# Patient Record
Sex: FEMALE | Race: WHITE | Employment: FULL TIME | URBAN - METROPOLITAN AREA
[De-identification: names, ages, dates, MRNs, and addresses within clinical notes are randomized per-mention and may not be internally consistent; named-entity substitution may affect disease eponyms.]

---

## 2019-07-05 ENCOUNTER — APPOINTMENT (OUTPATIENT)
Dept: ULTRASOUND IMAGING | Age: 59
End: 2019-07-05
Attending: EMERGENCY MEDICINE
Payer: COMMERCIAL

## 2019-07-05 ENCOUNTER — HOSPITAL ENCOUNTER (EMERGENCY)
Age: 59
Discharge: HOME | End: 2019-07-05
Attending: EMERGENCY MEDICINE
Payer: COMMERCIAL

## 2019-07-05 VITALS
WEIGHT: 272.49 LBS | OXYGEN SATURATION: 98 % | BODY MASS INDEX: 46.52 KG/M2 | TEMPERATURE: 98.1 F | SYSTOLIC BLOOD PRESSURE: 124 MMHG | DIASTOLIC BLOOD PRESSURE: 65 MMHG | HEIGHT: 64 IN | RESPIRATION RATE: 18 BRPM | HEART RATE: 69 BPM

## 2019-07-05 DIAGNOSIS — M79.604 PAIN OF RIGHT LOWER EXTREMITY: Primary | ICD-10-CM

## 2019-07-05 DIAGNOSIS — R79.1 ELEVATED INR: ICD-10-CM

## 2019-07-05 LAB — INR BLD: 4.4 (ref 0.9–1.2)

## 2019-07-05 PROCEDURE — 85610 PROTHROMBIN TIME: CPT

## 2019-07-05 PROCEDURE — 93971 EXTREMITY STUDY: CPT

## 2019-07-05 PROCEDURE — 99283 EMERGENCY DEPT VISIT LOW MDM: CPT

## 2019-07-05 NOTE — DISCHARGE INSTRUCTIONS
Patient Education        Leg Pain: Care Instructions  Your Care Instructions  Many things can cause leg pain. Too much exercise or overuse can cause a muscle cramp (or charley horse). You can get leg cramps from not eating a balanced diet that has enough potassium, calcium, and other minerals. If you do not drink enough fluids or are taking certain medicines, you may develop leg cramps. Other causes of leg pain include injuries, blood flow problems, nerve damage, and twisted and enlarged veins (varicose veins). You can usually ease pain with self-care. Your doctor may recommend that you rest your leg and keep it elevated. Follow-up care is a key part of your treatment and safety. Be sure to make and go to all appointments, and call your doctor if you are having problems. It's also a good idea to know your test results and keep a list of the medicines you take. How can you care for yourself at home? · Take pain medicines exactly as directed. ? If the doctor gave you a prescription medicine for pain, take it as prescribed. ? If you are not taking a prescription pain medicine, ask your doctor if you can take an over-the-counter medicine. · Take any other medicines exactly as prescribed. Call your doctor if you think you are having a problem with your medicine. · Rest your leg while you have pain, and avoid standing for long periods of time. · Prop up your leg at or above the level of your heart when possible. · Make sure you are eating a balanced diet that is rich in calcium, potassium, and magnesium, especially if you are pregnant. · If directed by your doctor, put ice or a cold pack on the area for 10 to 20 minutes at a time. Put a thin cloth between the ice and your skin. · Your leg may be in a splint, a brace, or an elastic bandage, and you may have crutches to help you walk. Follow your doctor's directions about how long to wear supports and how to use the crutches.   When should you call for help?  Call 911 anytime you think you may need emergency care. For example, call if:    · You have sudden chest pain and shortness of breath, or you cough up blood.     · Your leg is cool or pale or changes color.    Call your doctor now or seek immediate medical care if:    · You have increasing or severe pain.     · Your leg suddenly feels weak and you cannot move it.     · You have signs of a blood clot, such as:  ? Pain in your calf, back of the knee, thigh, or groin. ? Redness and swelling in your leg or groin.     · You have signs of infection, such as:  ? Increased pain, swelling, warmth, or redness. ? Red streaks leading from the sore area. ? Pus draining from a place on your leg. ? A fever.     · You cannot bear weight on your leg.    Watch closely for changes in your health, and be sure to contact your doctor if:    · You do not get better as expected. Where can you learn more? Go to http://kd-kecia.info/. Enter Y380 in the search box to learn more about \"Leg Pain: Care Instructions. \"  Current as of: September 23, 2018  Content Version: 11.9  © 4743-0111 [employer]. Care instructions adapted under license by [employer] (which disclaims liability or warranty for this information). If you have questions about a medical condition or this instruction, always ask your healthcare professional. [place] [address] any warranty or liability for your use of this information.

## 2019-07-05 NOTE — ED PROVIDER NOTES
Pt. Presents to the ER with complaints of right lower leg pain. Pt. Has a history of a clotting disorder, and she is on coumadin. Pt. Was seen by [facility] and was sent to the ER for evaluation for possible DVT. Pt. Says that she has been taking her coumadin regularly. No CP or SOB. NO n/v/d.  NO changes with her urine or bowel movements. Her pain originally started near her right foot, but it has migrated to behind the right knee. Pt. Has not noticed swelling to her right leg.              Past Medical History:   Diagnosis Date    Diabetes ([facility] [address]     pre-diabetic    GERD (gastroesophageal reflux disease)     Hypertension     Other ill-defined conditions(799.89)     environmental allergies - oak, pollen, dust, mold    Other ill-defined conditions(799.89)     clotting disorder - makes too much factor 8    Thromboembolus ([facility] [address]     PE x 3 - all at once    Thrombophilia ([facility] [address]     Thyroid disease        Past Surgical History:   Procedure Laterality Date    HX APPENDECTOMY  1993    HX GYN      lysis of adhesions 3-4 x for endometriosis    HX HYSTERECTOMY  1994    with oophorectomy    HX OTHER SURGICAL  2010    EGD - h.pylori/colon - diverticulosis    HX OTHER SURGICAL      surgery at 6 wks of age for ?plyoric stenosis         Family History:   Problem Relation Age of Onset    Kidney Disease Mother         failure    Diabetes Mother        Social History     Socioeconomic History    Marital status:      Spouse name: Not on file    Number of children: Not on file    Years of education: Not on file    Highest education level: Not on file   Occupational History    Not on file   Social Needs    Financial resource strain: Not on file    Food insecurity:     Worry: Not on file     Inability: Not on file    Transportation needs:     Medical: Not on file     Non-medical: Not on file   Tobacco Use    Smoking status: Never Smoker    Smokeless tobacco: Never Used   Substance and Sexual Activity  Alcohol use: No    Drug use: Not on file    Sexual activity: Not on file   Lifestyle    Physical activity:     Days per week: Not on file     Minutes per session: Not on file    Stress: Not on file   Relationships    Social connections:     Talks on phone: Not on file     Gets together: Not on file     Attends Sikhism service: Not on file     Active member of club or organization: Not on file     Attends meetings of clubs or organizations: Not on file     Relationship status: Not on file    Intimate partner violence:     Fear of current or ex partner: Not on file     Emotionally abused: Not on file     Physically abused: Not on file     Forced sexual activity: Not on file   Other Topics Concern    Not on file   Social History Narrative    Not on file         ALLERGIES: Biaxin [clarithromycin]; Levaquin [levofloxacin]; Naprosyn [naproxen]; Pcn [penicillins]; and Tequin [gatifloxacin]    Review of Systems   Constitutional: Negative for chills and fever. HENT: Negative for rhinorrhea and sore throat. Respiratory: Negative for cough and shortness of breath. Cardiovascular: Negative for chest pain. Gastrointestinal: Negative for abdominal pain, diarrhea, nausea and vomiting. Genitourinary: Negative for dysuria and urgency. Musculoskeletal:        Right leg pain   Skin: Negative for rash. Neurological: Negative for dizziness, weakness and light-headedness. Vitals:    07/05/19 1745   BP: 127/74   Pulse: 64   Resp: 16   Temp: 97.9 °F (36.6 °C)   SpO2: 93%   Weight: 123.6 kg (272 lb 7.8 oz)   Height: 5' 4\" (1.626 m)            Physical Exam     Vital signs reviewed. Nursing notes reviewed.     Const:  No acute distress, well developed, well nourished  Head:  Atraumatic, normocephalic  Eyes:  PERRL, conjunctiva normal, no scleral icterus  Neck:  Supple, trachea midline  Cardiovascular:  RRR, no murmurs, no gallops, no rubs  Resp:  No resp distress, no increased work of breathing, no wheezes, no rhonchi, no rales,  Abd:  Soft, non-tender, non-distended, no rebound, no guarding, no CVA tenderness  MSK:  No pedal edema, normal ROM, no tenderness to palpation of the RLE  Neuro:  Alert and oriented x3, no cranial nerve defect  Skin:  Warm, dry, intact  Psych: normal mood and affect, behavior is normal, judgement and thought content is normal          MDM  Number of Diagnoses or Management Options  Elevated INR:   Pain of right lower extremity:      Amount and/or Complexity of Data Reviewed  Clinical lab tests: ordered and reviewed  Tests in the radiology section of CPT®: reviewed and ordered  Review and summarize past medical records: yes    Patient Progress  Patient progress: stable           Pt. Presents to the ER with complaints of leg pain. No DVT on ultrasound. No signs of infection. Likely strain. Pt. Found to have an elevated INR. Pt. Instructed to not take her coumadin tonight. Pt. Says that she will speak to her hematologist or the on call doc tonight.         Procedures

## 2019-07-05 NOTE — ED TRIAGE NOTES
Triage Note: Patient arrives to ER complaining of right leg pain x 1 week. Pt was sent from [facility] for r/o DVT. Pt is currently taking coumadin.  Was unable to get blood level drawn fat [facility].

## 2019-07-05 NOTE — LETTER
[address] 
[facility] 
[address] [id number] 
[phone] Work/School Note Date: 7/5/2019 To Whom It May concern: 
 
Alma Clarke was seen and treated today in the emergency room by the following provider(s): 
No providers found. Alma Clarke may return to work on Sunday July 7, 2019.  
 
Sincerely, 
 
 
 
 
Norma Mcgowan RN

## 2019-07-05 NOTE — ED NOTES
Pt ambulatory out of ED with discharge instructions and prescriptions in hand given by Dr. Monalisa Whitehead; pt verbalized understanding of discharge paperwork and time allotted for questions. VSS. Pt alert and oriented.

## 2019-08-06 ENCOUNTER — APPOINTMENT (OUTPATIENT)
Dept: CT IMAGING | Age: 59
End: 2019-08-06
Attending: EMERGENCY MEDICINE
Payer: COMMERCIAL

## 2019-08-06 ENCOUNTER — HOSPITAL ENCOUNTER (EMERGENCY)
Age: 59
Discharge: HOME | End: 2019-08-06
Attending: EMERGENCY MEDICINE
Payer: COMMERCIAL

## 2019-08-06 VITALS
HEART RATE: 75 BPM | HEIGHT: 64 IN | RESPIRATION RATE: 16 BRPM | TEMPERATURE: 98.3 F | SYSTOLIC BLOOD PRESSURE: 130 MMHG | DIASTOLIC BLOOD PRESSURE: 72 MMHG | BODY MASS INDEX: 45.77 KG/M2 | OXYGEN SATURATION: 95 % | WEIGHT: 268.08 LBS

## 2019-08-06 DIAGNOSIS — R79.1 ELEVATED INR: ICD-10-CM

## 2019-08-06 DIAGNOSIS — R10.84 ABDOMINAL PAIN, GENERALIZED: Primary | ICD-10-CM

## 2019-08-06 LAB
ALBUMIN SERPL-MCNC: 3.6 G/DL (ref 3.5–5)
ALBUMIN/GLOB SERPL: 0.9 {RATIO} (ref 1.1–2.2)
ALP SERPL-CCNC: 114 U/L (ref 45–117)
ALT SERPL-CCNC: 35 U/L (ref 12–78)
ANION GAP SERPL CALC-SCNC: 11 MMOL/L (ref 5–15)
AST SERPL-CCNC: 20 U/L (ref 15–37)
BASOPHILS # BLD: 0.1 K/UL (ref 0–0.1)
BASOPHILS NFR BLD: 1 % (ref 0–1)
BILIRUB DIRECT SERPL-MCNC: 0.1 MG/DL (ref 0–0.2)
BILIRUB SERPL-MCNC: 0.3 MG/DL (ref 0.2–1)
BUN SERPL-MCNC: 11 MG/DL (ref 6–20)
BUN/CREAT SERPL: 13 (ref 12–20)
CALCIUM SERPL-MCNC: 9.5 MG/DL (ref 8.5–10.1)
CHLORIDE SERPL-SCNC: 99 MMOL/L (ref 97–108)
CO2 SERPL-SCNC: 28 MMOL/L (ref 21–32)
COMMENT, HOLDF: NORMAL
CREAT SERPL-MCNC: 0.83 MG/DL (ref 0.55–1.02)
DIFFERENTIAL METHOD BLD: ABNORMAL
EOSINOPHIL # BLD: 0.4 K/UL (ref 0–0.4)
EOSINOPHIL NFR BLD: 3 % (ref 0–7)
ERYTHROCYTE [DISTWIDTH] IN BLOOD BY AUTOMATED COUNT: 16 % (ref 11.5–14.5)
GLOBULIN SER CALC-MCNC: 4.2 G/DL (ref 2–4)
GLUCOSE SERPL-MCNC: 102 MG/DL (ref 65–100)
HCT VFR BLD AUTO: 40.7 % (ref 35–47)
HGB BLD-MCNC: 13.2 G/DL (ref 11.5–16)
IMM GRANULOCYTES # BLD AUTO: 0 K/UL (ref 0–0.04)
IMM GRANULOCYTES NFR BLD AUTO: 0 % (ref 0–0.5)
INR BLD: 5.2 (ref 0.9–1.2)
LIPASE SERPL-CCNC: 125 U/L (ref 73–393)
LYMPHOCYTES # BLD: 3.7 K/UL (ref 0.8–3.5)
LYMPHOCYTES NFR BLD: 28 % (ref 12–49)
MCH RBC QN AUTO: 27.8 PG (ref 26–34)
MCHC RBC AUTO-ENTMCNC: 32.4 G/DL (ref 30–36.5)
MCV RBC AUTO: 85.7 FL (ref 80–99)
MONOCYTES # BLD: 1.5 K/UL (ref 0–1)
MONOCYTES NFR BLD: 11 % (ref 5–13)
NEUTS SEG # BLD: 7.4 K/UL (ref 1.8–8)
NEUTS SEG NFR BLD: 56 % (ref 32–75)
NRBC # BLD: 0.02 K/UL (ref 0–0.01)
NRBC BLD-RTO: 0.2 PER 100 WBC
PLATELET # BLD AUTO: 391 K/UL (ref 150–400)
PMV BLD AUTO: 10.2 FL (ref 8.9–12.9)
POTASSIUM SERPL-SCNC: 4 MMOL/L (ref 3.5–5.1)
PROT SERPL-MCNC: 7.8 G/DL (ref 6.4–8.2)
RBC # BLD AUTO: 4.75 M/UL (ref 3.8–5.2)
SAMPLES BEING HELD,HOLD: NORMAL
SODIUM SERPL-SCNC: 138 MMOL/L (ref 136–145)
WBC # BLD AUTO: 13.2 K/UL (ref 3.6–11)

## 2019-08-06 PROCEDURE — 74011250636 HC RX REV CODE- 250/636: Performed by: EMERGENCY MEDICINE

## 2019-08-06 PROCEDURE — 80048 BASIC METABOLIC PNL TOTAL CA: CPT

## 2019-08-06 PROCEDURE — 99283 EMERGENCY DEPT VISIT LOW MDM: CPT

## 2019-08-06 PROCEDURE — 83690 ASSAY OF LIPASE: CPT

## 2019-08-06 PROCEDURE — 99284 EMERGENCY DEPT VISIT MOD MDM: CPT

## 2019-08-06 PROCEDURE — 85610 PROTHROMBIN TIME: CPT

## 2019-08-06 PROCEDURE — 74011636320 HC RX REV CODE- 636/320: Performed by: EMERGENCY MEDICINE

## 2019-08-06 PROCEDURE — 80076 HEPATIC FUNCTION PANEL: CPT

## 2019-08-06 PROCEDURE — 85025 COMPLETE CBC W/AUTO DIFF WBC: CPT

## 2019-08-06 PROCEDURE — 36415 COLL VENOUS BLD VENIPUNCTURE: CPT

## 2019-08-06 PROCEDURE — 74177 CT ABD & PELVIS W/CONTRAST: CPT

## 2019-08-06 PROCEDURE — 96360 HYDRATION IV INFUSION INIT: CPT

## 2019-08-06 RX ORDER — SODIUM CHLORIDE 9 MG/ML
10 INJECTION INTRAMUSCULAR; INTRAVENOUS; SUBCUTANEOUS ONCE
Status: COMPLETED | OUTPATIENT
Start: 2019-08-06 | End: 2019-08-06

## 2019-08-06 RX ORDER — DICYCLOMINE HYDROCHLORIDE 10 MG/1
10 CAPSULE ORAL
Qty: 12 CAP | Refills: 0 | Status: SHIPPED | OUTPATIENT
Start: 2019-08-06 | End: 2019-08-11

## 2019-08-06 RX ORDER — ONDANSETRON 4 MG/1
4 TABLET, ORALLY DISINTEGRATING ORAL
Qty: 15 TAB | Refills: 0 | Status: SHIPPED | OUTPATIENT
Start: 2019-08-06 | End: 2020-03-31

## 2019-08-06 RX ORDER — SODIUM CHLORIDE 9 MG/ML
50 INJECTION, SOLUTION INTRAVENOUS
Status: COMPLETED | OUTPATIENT
Start: 2019-08-06 | End: 2019-08-06

## 2019-08-06 RX ADMIN — SODIUM CHLORIDE 1000 ML: 900 INJECTION, SOLUTION INTRAVENOUS at 18:12

## 2019-08-06 RX ADMIN — IOPAMIDOL 100 ML: 755 INJECTION, SOLUTION INTRAVENOUS at 18:08

## 2019-08-06 RX ADMIN — SODIUM CHLORIDE 10 ML: 9 INJECTION INTRAMUSCULAR; INTRAVENOUS; SUBCUTANEOUS at 18:07

## 2019-08-06 RX ADMIN — SODIUM CHLORIDE 50 ML/HR: 900 INJECTION, SOLUTION INTRAVENOUS at 18:07

## 2019-08-06 NOTE — DISCHARGE INSTRUCTIONS
Patient Education        Abdominal Pain: Care Instructions  Your Care Instructions    Abdominal pain has many possible causes. Some aren't serious and get better on their own in a few days. Others need more testing and treatment. If your pain continues or gets worse, you need to be rechecked and may need more tests to find out what is wrong. You may need surgery to correct the problem. Don't ignore new symptoms, such as fever, nausea and vomiting, urination problems, pain that gets worse, and dizziness. These may be signs of a more serious problem. Your doctor may have recommended a follow-up visit in the next 8 to 12 hours. If you are not getting better, you may need more tests or treatment. The doctor has checked you carefully, but problems can develop later. If you notice any problems or new symptoms, get medical treatment right away. Follow-up care is a key part of your treatment and safety. Be sure to make and go to all appointments, and call your doctor if you are having problems. It's also a good idea to know your test results and keep a list of the medicines you take. How can you care for yourself at home? · Rest until you feel better. · To prevent dehydration, drink plenty of fluids, enough so that your urine is light yellow or clear like water. Choose water and other caffeine-free clear liquids until you feel better. If you have kidney, heart, or liver disease and have to limit fluids, talk with your doctor before you increase the amount of fluids you drink. · If your stomach is upset, eat mild foods, such as rice, dry toast or crackers, bananas, and applesauce. Try eating several small meals instead of two or three large ones. · Wait until 48 hours after all symptoms have gone away before you have spicy foods, alcohol, and drinks that contain caffeine. · Do not eat foods that are high in fat. · Avoid anti-inflammatory medicines such as aspirin, ibuprofen (Advil, Motrin), and naproxen (Aleve). These can cause stomach upset. Talk to your doctor if you take daily aspirin for another health problem. When should you call for help? Call 911 anytime you think you may need emergency care. For example, call if:    · You passed out (lost consciousness).     · You pass maroon or very bloody stools.     · You vomit blood or what looks like coffee grounds.     · You have new, severe belly pain.    Call your doctor now or seek immediate medical care if:    · Your pain gets worse, especially if it becomes focused in one area of your belly.     · You have a new or higher fever.     · Your stools are black and look like tar, or they have streaks of blood.     · You have unexpected vaginal bleeding.     · You have symptoms of a urinary tract infection. These may include:  ? Pain when you urinate. ? Urinating more often than usual.  ? Blood in your urine.     · You are dizzy or lightheaded, or you feel like you may faint.    Watch closely for changes in your health, and be sure to contact your doctor if:    · You are not getting better after 1 day (24 hours). Where can you learn more? Go to http://kdBirthday Gorillakecia.info/. Enter C042 in the search box to learn more about \"Abdominal Pain: Care Instructions. \"  Current as of: September 23, 2018  Content Version: 12.1  © 8852-7087 [employer]. Care instructions adapted under license by [employer] (which disclaims liability or warranty for this information). If you have questions about a medical condition or this instruction, always ask your healthcare professional. [place] [address] any warranty or liability for your use of this information. Patient Education     High INR Test Result: Care Instructions  Your Care Instructions  You had a blood test to check how long it takes your blood to clot. This test is called a PT or prothrombin time test. The result of the test is called the INR level.   A high INR level can happen when you take warfarin (Coumadin). Warfarin helps prevent blood clots. To do this, it slows the amount of time it takes for your blood to clot. This raises your INR level. The INR goal for people who take warfarin is usually from 2 to 3.5. A value higher than 3.5 increases the risk of bleeding problems. Many things can affect the way warfarin works. Some natural health products and other medicines can make warfarin work too well. That can raise the risk of bleeding. If you drink a lot of alcohol, that may raise your INR. And severe diarrhea or vomiting can also raise your INR. The best way to lower your INR will depend on several things. In some cases, the doctor may have you stop taking warfarin for a few days. You may also be given other medicines to take. You will need to be tested often to make sure your INR level is going down. You will also need to watch for signs of bleeding. The doctor has checked you carefully, but problems can develop later. If you notice any problems or new symptoms, get medical treatment right away. Follow-up care is a key part of your treatment and safety. Be sure to make and go to all appointments, and call your doctor if you are having problems. It's also a good idea to know your test results and keep a list of the medicines you take. How can you care for yourself at home? Be careful with medicines and foods  · Don't start or stop taking any medicines, vitamins, or natural remedies unless you first talk to your doctor. · Keep the amount of vitamin K in your diet about the same from day to day. Do not suddenly eat a lot more or a lot less food that is rich in vitamin K than you usually do. Vitamin K affects how warfarin works and how your blood clots. · Avoid cranberry juice and other cranberry products. They can increase the effects of warfarin. · Limit your use of alcohol. Avoid bleeding by preventing falls  · Wear slippers or shoes with nonskid soles.   · Remove throw rugs and clutter. · Rearrange furniture and electrical cords to keep them out of walking paths. · Keep stairways, porches, and outside walkways well lit. Use night-lights in hallways and bathrooms. · Be extra careful when you work with sharp tools or knives. When should you call for help? Call 911 anytime you think you may need emergency care. For example, call if:  · You have a sudden, severe headache that is different from past headaches. Call your doctor now or seek immediate medical care if:  · You have any abnormal bleeding, such as:  ¨ Nosebleeds. ¨ Vaginal bleeding that is different (heavier, more frequent, at a different time of the month) than what you are used to. ¨ Bloody or black stools, or rectal bleeding. ¨ Bloody or pink urine. Watch closely for changes in your health, and be sure to contact your doctor if you have any problems. Where can you learn more? Go to [employer]be  Enter C178 in the search box to learn more about \"High INR Test Result: Care Instructions. \"   © 5542-4967 [employer]. Care instructions adapted under license by [employer] (which disclaims liability or warranty for this information). This care instruction is for use with your licensed healthcare professional. If you have questions about a medical condition or this instruction, always ask your healthcare professional. [place] [address] any warranty or liability for your use of this information.   Content Version: 08.9.896745; Current as of: November 20, 2015

## 2019-08-06 NOTE — ED TRIAGE NOTES
Pt states after eating at a restaurant, having salad, salmon, pasta, and cake, she began having upper abdominal pain. Nauseous last night, vomited once during night, now having lower abdominal pain. Denies urinary sx's. Recently on keflex for abscess on left labia, stopped over weekend d/t diarrhea. Started Thurs, stopped yesterday.

## 2019-08-06 NOTE — ED PROVIDER NOTES
76-year-old white female presents to the emergency department with abdominal pain. Patient reports she went to dinner with her  last night. She came in from dinner and was not feeling well. She was having some mid and upper abdominal pain. The pain continued throughout the night. She then states the pain is now in the lower abdomen. Pain is dull in nature. Pain does not radiate. No vomiting. No diarrhea. No dysuria or hematuria. No fevers. [facility] and [facility] that showed white blood cell count of 11. Normal electrolytes. Urinalysis was clear. Patient was sent here for CT of the abdomen. She denies alcohol or tobacco use. Patient does have a history of pancreatic pseudocyst and pancreatic surgery several years ago. She also had a splenectomy at that time. Patient is on Coumadin for a blood clotting disorder.            Past Medical History:   Diagnosis Date    Diabetes ([address]     pre-diabetic    GERD (gastroesophageal reflux disease)     Hypertension     Other ill-defined conditions(799.89)     environmental allergies - oak, pollen, dust, mold    Other ill-defined conditions(799.89)     clotting disorder - makes too much factor 8    Thromboembolus ([address]     PE x 3 - all at once    Thrombophilia ([facility] [address]     Thyroid disease        Past Surgical History:   Procedure Laterality Date    HX APPENDECTOMY  1993    HX GYN      lysis of adhesions 3-4 x for endometriosis    HX HYSTERECTOMY  1994    with oophorectomy    HX OTHER SURGICAL  2010    EGD - h.pylori/colon - diverticulosis    HX OTHER SURGICAL      surgery at 6 wks of age for ?plyoric stenosis         Family History:   Problem Relation Age of Onset    Kidney Disease Mother         failure    Diabetes Mother        Social History     Socioeconomic History    Marital status:      Spouse name: Not on file    Number of children: Not on file    Years of education: Not on file    Highest education level: Not on file   Occupational History    Not on file   Social Needs    Financial resource strain: Not on file    Food insecurity:     Worry: Not on file     Inability: Not on file    Transportation needs:     Medical: Not on file     Non-medical: Not on file   Tobacco Use    Smoking status: Never Smoker    Smokeless tobacco: Never Used   Substance and Sexual Activity    Alcohol use: No    Drug use: Not on file    Sexual activity: Not on file   Lifestyle    Physical activity:     Days per week: Not on file     Minutes per session: Not on file    Stress: Not on file   Relationships    Social connections:     Talks on phone: Not on file     Gets together: Not on file     Attends Sikhism service: Not on file     Active member of club or organization: Not on file     Attends meetings of clubs or organizations: Not on file     Relationship status: Not on file    Intimate partner violence:     Fear of current or ex partner: Not on file     Emotionally abused: Not on file     Physically abused: Not on file     Forced sexual activity: Not on file   Other Topics Concern    Not on file   Social History Narrative    Not on file         ALLERGIES: Biaxin [clarithromycin]; Levaquin [levofloxacin]; Naprosyn [naproxen]; Pcn [penicillins]; and Tequin [gatifloxacin]    Review of Systems   Constitutional: Negative for fever. HENT: Negative for facial swelling. Eyes: Negative for pain. Respiratory: Negative for cough, chest tightness and shortness of breath. Cardiovascular: Negative for chest pain and leg swelling. Gastrointestinal: Positive for abdominal distention and abdominal pain. Negative for diarrhea and vomiting. Endocrine: Negative for polyuria. Genitourinary: Negative for difficulty urinating and flank pain. Musculoskeletal: Negative for arthralgias and back pain. Skin: Negative for color change. Allergic/Immunologic: Negative for immunocompromised state.    Neurological: Negative for dizziness and headaches. Hematological: Does not bruise/bleed easily. Psychiatric/Behavioral: Negative for confusion. All other systems reviewed and are negative. There were no vitals filed for this visit. Physical Exam   Constitutional: She is oriented to person, place, and time. She appears well-developed and well-nourished. HENT:   Head: Normocephalic and atraumatic. Right Ear: External ear normal.   Left Ear: External ear normal.   Nose: Nose normal.   Mouth/Throat: Oropharynx is clear and moist.   Eyes: Pupils are equal, round, and reactive to light. EOM are normal. No scleral icterus. Neck: Normal range of motion. Neck supple. No JVD present. No tracheal deviation present. No thyromegaly present. Cardiovascular: Normal rate, regular rhythm, normal heart sounds and intact distal pulses. Exam reveals no friction rub. No murmur heard. Pulmonary/Chest: Effort normal and breath sounds normal. No stridor. No respiratory distress. She has no wheezes. She has no rales. She exhibits no tenderness. Abdominal: Soft. Bowel sounds are normal. She exhibits no distension. There is tenderness. There is no rebound and no guarding. Mild lower abdominal pain to palpation. Musculoskeletal: Normal range of motion. She exhibits no edema or tenderness. Lymphadenopathy:     She has no cervical adenopathy. Neurological: She is alert and oriented to person, place, and time. She has normal reflexes. No cranial nerve deficit. Coordination normal.   Skin: Skin is warm and dry. No rash noted. No erythema. Psychiatric: She has a normal mood and affect. Her behavior is normal. Judgment and thought content normal.   Nursing note and vitals reviewed. MDM  Number of Diagnoses or Management Options  Diagnosis management comments: Patient has mid abdominal pain. Abdomen is fairly benign. Will check basic blood work. Will check CT of the abdomen.   [facility] at [facility]  Will reassess after CT. Patient agrees. Amount and/or Complexity of Data Reviewed  Clinical lab tests: ordered and reviewed  Tests in the radiology section of CPT®: ordered and reviewed  Tests in the medicine section of CPT®: ordered and reviewed  Decide to obtain previous medical records or to obtain history from someone other than the patient: yes  Obtain history from someone other than the patient: yes  Review and summarize past medical records: yes  Independent visualization of images, tracings, or specimens: yes    Risk of Complications, Morbidity, and/or Mortality  Presenting problems: high  Diagnostic procedures: high  Management options: high           Procedures    [facility] at [facility] was clear (scanned into media)    INR is elevated at 5.2. Will have patient not take her Coumadin today or tomorrow. She will follow-up with her primary doctor in 3 days for INR recheck. CT scan is negative    Good return precautions given to patient. Close follow up with PCP recommended. Patient and/or family voices understanding of this plan. Discharge instructions were explained by me and all concerns were addressed.

## 2019-08-06 NOTE — LETTER
[address] 
[facility] EMERGENCY [facility] 
[address] [address] [phone] Work/School Note Date: 8/6/2019 To Whom It May concern: 
 
Nicola Ramirez was seen and treated today in the emergency room by the following provider(s): 
Attending Provider: Comfort López MD. Nicola Ramirez please excuse from work 8/6 and 8/7 Sincerely, 
 
 
 
 
Malick Russ MD

## 2019-10-08 ENCOUNTER — APPOINTMENT (OUTPATIENT)
Dept: GENERAL RADIOLOGY | Age: 59
End: 2019-10-08
Attending: EMERGENCY MEDICINE
Payer: COMMERCIAL

## 2019-10-08 ENCOUNTER — HOSPITAL ENCOUNTER (EMERGENCY)
Age: 59
Discharge: HOME | End: 2019-10-09
Attending: EMERGENCY MEDICINE
Payer: COMMERCIAL

## 2019-10-08 DIAGNOSIS — D72.829 LEUKOCYTOSIS, UNSPECIFIED TYPE: ICD-10-CM

## 2019-10-08 DIAGNOSIS — R50.9 FEVER, UNSPECIFIED FEVER CAUSE: Primary | ICD-10-CM

## 2019-10-08 LAB
ALBUMIN SERPL-MCNC: 3.3 G/DL (ref 3.5–5)
ALBUMIN/GLOB SERPL: 0.8 {RATIO} (ref 1.1–2.2)
ALP SERPL-CCNC: 110 U/L (ref 45–117)
ALT SERPL-CCNC: 27 U/L (ref 12–78)
ANION GAP SERPL CALC-SCNC: 13 MMOL/L (ref 5–15)
APPEARANCE UR: CLEAR
AST SERPL-CCNC: 18 U/L (ref 15–37)
BACTERIA URNS QL MICRO: NEGATIVE /HPF
BASOPHILS # BLD: 0.1 K/UL (ref 0–0.1)
BASOPHILS NFR BLD: 0 % (ref 0–1)
BILIRUB SERPL-MCNC: 0.6 MG/DL (ref 0.2–1)
BILIRUB UR QL: NEGATIVE
BUN SERPL-MCNC: 11 MG/DL (ref 6–20)
BUN/CREAT SERPL: 13 (ref 12–20)
CALCIUM SERPL-MCNC: 9.1 MG/DL (ref 8.5–10.1)
CHLORIDE SERPL-SCNC: 101 MMOL/L (ref 97–108)
CO2 SERPL-SCNC: 24 MMOL/L (ref 21–32)
COLOR UR: NORMAL
COMMENT, HOLDF: NORMAL
CREAT SERPL-MCNC: 0.85 MG/DL (ref 0.55–1.02)
DIFFERENTIAL METHOD BLD: ABNORMAL
EOSINOPHIL # BLD: 0.5 K/UL (ref 0–0.4)
EOSINOPHIL NFR BLD: 2 % (ref 0–7)
EPITH CASTS URNS QL MICRO: NORMAL /LPF
ERYTHROCYTE [DISTWIDTH] IN BLOOD BY AUTOMATED COUNT: 15.9 % (ref 11.5–14.5)
GLOBULIN SER CALC-MCNC: 4.4 G/DL (ref 2–4)
GLUCOSE SERPL-MCNC: 102 MG/DL (ref 65–100)
GLUCOSE UR STRIP.AUTO-MCNC: NEGATIVE MG/DL
HCT VFR BLD AUTO: 42.7 % (ref 35–47)
HGB BLD-MCNC: 13.8 G/DL (ref 11.5–16)
HGB UR QL STRIP: NEGATIVE
IMM GRANULOCYTES # BLD AUTO: 0.1 K/UL (ref 0–0.04)
IMM GRANULOCYTES NFR BLD AUTO: 1 % (ref 0–0.5)
KETONES UR QL STRIP.AUTO: NEGATIVE MG/DL
LACTATE BLD-SCNC: 0.8 MMOL/L (ref 0.4–2)
LEUKOCYTE ESTERASE UR QL STRIP.AUTO: NEGATIVE
LIPASE SERPL-CCNC: 101 U/L (ref 73–393)
LYMPHOCYTES # BLD: 1.8 K/UL (ref 0.8–3.5)
LYMPHOCYTES NFR BLD: 9 % (ref 12–49)
MCH RBC QN AUTO: 28 PG (ref 26–34)
MCHC RBC AUTO-ENTMCNC: 32.3 G/DL (ref 30–36.5)
MCV RBC AUTO: 86.8 FL (ref 80–99)
MONOCYTES # BLD: 1.1 K/UL (ref 0–1)
MONOCYTES NFR BLD: 6 % (ref 5–13)
NEUTS SEG # BLD: 16.8 K/UL (ref 1.8–8)
NEUTS SEG NFR BLD: 82 % (ref 32–75)
NITRITE UR QL STRIP.AUTO: NEGATIVE
NRBC # BLD: 0.03 K/UL (ref 0–0.01)
NRBC BLD-RTO: 0.1 PER 100 WBC
PH UR STRIP: 6 [PH] (ref 5–8)
PLATELET # BLD AUTO: 372 K/UL (ref 150–400)
PMV BLD AUTO: 11.2 FL (ref 8.9–12.9)
POTASSIUM SERPL-SCNC: 3.9 MMOL/L (ref 3.5–5.1)
PROT SERPL-MCNC: 7.7 G/DL (ref 6.4–8.2)
PROT UR STRIP-MCNC: NEGATIVE MG/DL
RBC # BLD AUTO: 4.92 M/UL (ref 3.8–5.2)
RBC #/AREA URNS HPF: NORMAL /HPF (ref 0–5)
SAMPLES BEING HELD,HOLD: NORMAL
SODIUM SERPL-SCNC: 138 MMOL/L (ref 136–145)
SP GR UR REFRACTOMETRY: <1.005 (ref 1–1.03)
UROBILINOGEN UR QL STRIP.AUTO: 0.2 EU/DL (ref 0.2–1)
WBC # BLD AUTO: 20.4 K/UL (ref 3.6–11)
WBC URNS QL MICRO: NORMAL /HPF (ref 0–4)

## 2019-10-08 PROCEDURE — 80053 COMPREHEN METABOLIC PANEL: CPT

## 2019-10-08 PROCEDURE — 87040 BLOOD CULTURE FOR BACTERIA: CPT

## 2019-10-08 PROCEDURE — 99284 EMERGENCY DEPT VISIT MOD MDM: CPT

## 2019-10-08 PROCEDURE — 81001 URINALYSIS AUTO W/SCOPE: CPT

## 2019-10-08 PROCEDURE — 87086 URINE CULTURE/COLONY COUNT: CPT

## 2019-10-08 PROCEDURE — 74011250636 HC RX REV CODE- 250/636: Performed by: EMERGENCY MEDICINE

## 2019-10-08 PROCEDURE — 83605 ASSAY OF LACTIC ACID: CPT

## 2019-10-08 PROCEDURE — 83690 ASSAY OF LIPASE: CPT

## 2019-10-08 PROCEDURE — 36415 COLL VENOUS BLD VENIPUNCTURE: CPT

## 2019-10-08 PROCEDURE — 85025 COMPLETE CBC W/AUTO DIFF WBC: CPT

## 2019-10-08 RX ADMIN — SODIUM CHLORIDE 641 ML: 900 INJECTION, SOLUTION INTRAVENOUS at 22:38

## 2019-10-08 RX ADMIN — SODIUM CHLORIDE 1000 ML: 900 INJECTION, SOLUTION INTRAVENOUS at 22:31

## 2019-10-09 VITALS
TEMPERATURE: 98.5 F | SYSTOLIC BLOOD PRESSURE: 110 MMHG | HEART RATE: 89 BPM | BODY MASS INDEX: 44.86 KG/M2 | OXYGEN SATURATION: 96 % | HEIGHT: 64 IN | RESPIRATION RATE: 16 BRPM | WEIGHT: 262.79 LBS | DIASTOLIC BLOOD PRESSURE: 58 MMHG

## 2019-10-09 NOTE — ED NOTES
Patient rounded on at this time. RN reassessed patient's pain at this time. Pt declined the need to use the potty but states that they will make RN aware if the need arises. Pt belongings (including phone) within reach at this time. Patient reports that they are in a position of comfort at this time. Call light is within reach. Bed in low position. Side rails up x2. Wheels locked.  RN made patient aware that they will be updated on plan of care as it evolves and staff will be back within the hour to check on them

## 2019-10-09 NOTE — ED TRIAGE NOTES
Pt sent by [facility] for evaluation for fever ha and body aches post getting flu shot yesterday. Pt states she normally feels this way post flu shoot.  highest fever noted by pt 101 .

## 2019-10-09 NOTE — DISCHARGE INSTRUCTIONS
Learning About High White Blood Cell Counts  What are white blood cells? White blood cells (leukocytes) help protect your body from infection. Normally, when germs get inside your body, your body makes more white blood cells that search for and destroy the germs. Less often, there are medical problems where the body may make a lot more white blood cells than it needs. What happens when you have a high white blood cell count? Your white blood cell count may be high because your body is fighting an infection. But other things can cause it, such as some medicines, burns, an illness, or other health problems. When your doctor sees that your white blood cell count is high, he or she will try to find out why, and then treat the cause. What are the symptoms? A high white blood cell count alone doesn't cause any symptoms. The symptoms you feel may come from the medical problem that your white blood cells are fighting. For example, if you have pneumonia, you may have a fever and trouble breathing. These are symptoms of pneumonia, not of a high white blood cell count. How is it treated? · Your doctor may do more tests to find the problem that's making your white blood cell count high. Once your doctor finds the problem, he or she may be able to treat it. · Part of your treatment may be telling your doctor if you feel worse. Watch your temperature, and call your doctor if your fever goes up and stays up. Follow-up care is a key part of your treatment and safety. Be sure to make and go to all appointments, and call your doctor if you are having problems. It's also a good idea to know your test results and keep a list of the medicines you take. Where can you learn more? Go to http://kd-kecia.info/. Enter F242 in the search box to learn more about \"Learning About High White Blood Cell Counts. \"  Current as of: March 28, 2019  Content Version: 12.2  © 5847-7218 [employer]. Care instructions adapted under license by [employer] (which disclaims liability or warranty for this information). If you have questions about a medical condition or this instruction, always ask your healthcare professional. [address] any warranty or liability for your use of this information. Patient Education        Learning About Fever  What is a fever? A fever is a high body temperature. It's one way your body fights being sick. A fever shows that the body is responding to infection or other illnesses, both minor and severe. A fever is a symptom, not an illness by itself. A fever can be a sign that you are ill, but most fevers are not caused by a serious problem. You may have a fever with a minor illness, such as a cold. But sometimes a very serious infection may cause little or no fever. It is important to look at other symptoms, other conditions you have, and how you feel in general. In children, notice how they act and see what symptoms they complain of. What is a normal body temperature? A normal body temperature is about 98. 6ºF. Some people have a normal temperature that is a little higher or a little lower than this. Your temperature may be a little lower in the morning than it is later in the day. It may go up during hot weather or when you exercise, wear heavy clothes, or take a hot bath. Your temperature may also be different depending on how you take it. A temperature taken in the mouth (oral) or under the arm may be a little lower than your core temperature (rectal). What is a fever temperature? A core temperature of 100.4°F or above is considered a fever. What can cause a fever? A fever may be caused by:  · Infections. This is the most common cause of a fever. Examples of infections that can cause a fever include the flu, a kidney infection, or pneumonia. · Some medicines.   · Severe trauma or injury, such as a heart attack, stroke, heatstroke, or burns.  · Other medical conditions, such as arthritis and some cancers. How can you treat a fever at home? · Ask your doctor if you can take an over-the-counter pain medicine, such as acetaminophen (Tylenol), ibuprofen (Advil, Motrin), or naproxen (Aleve). Be safe with medicines. Read and follow all instructions on the label. · To prevent dehydration, drink plenty of fluids. Choose water and other caffeine-free clear liquids until you feel better. If you have kidney, heart, or liver disease and have to limit fluids, talk with your doctor before you increase the amount of fluids you drink. Follow-up care is a key part of your treatment and safety. Be sure to make and go to all appointments, and call your doctor if you are having problems. It's also a good idea to know your test results and keep a list of the medicines you take. Where can you learn more? Go to http://kd-kecia.info/. Enter W573 in the search box to learn more about \"Learning About Fever. \"  Current as of: June 26, 2019  Content Version: 12.2  © 5699-5104 [employer]. Care instructions adapted under license by [employer] (which disclaims liability or warranty for this information). If you have questions about a medical condition or this instruction, always ask your healthcare professional. [address] any warranty or liability for your use of this information.

## 2019-10-09 NOTE — ED PROVIDER NOTES
Parisa Huang is a 62 yo F with history of splenectomy who has had fever, headache and body aches toady. She states that she had the flu shot yesterday and normally gets low grade fevers after vaccinations but today her fever was to 101. [facility] and labs checked and her WBC was 23. She had been at [facility] the day before to get her flu shot and to be evaluated for dysuria and was treated for UTI with macribid which she has been taking. She states that she has had not dysuria today.              Past Medical History:   Diagnosis Date    Diabetes ([facility]     pre-diabetic    GERD (gastroesophageal reflux disease)     Hypertension     Other ill-defined conditions(799.89)     environmental allergies - oak, pollen, dust, mold    Other ill-defined conditions(799.89)     clotting disorder - makes too much factor 8    Thromboembolus ([facility]     PE x 3 - all at once    [facility]     Thyroid disease        Past Surgical History:   Procedure Laterality Date    HX APPENDECTOMY  1993    HX GYN      lysis of adhesions 3-4 x for endometriosis    HX HYSTERECTOMY  1994    with oophorectomy    HX OTHER SURGICAL  2010    EGD - h.pylori/colon - diverticulosis    HX OTHER SURGICAL      surgery at 6 wks of age for ?plyoric stenosis         Family History:   Problem Relation Age of Onset    Kidney Disease Mother         failure    Diabetes Mother        Social History     Socioeconomic History    Marital status:      Spouse name: Not on file    Number of children: Not on file    Years of education: Not on file    Highest education level: Not on file   Occupational History    Not on file   Social Needs    Financial resource strain: Not on file    Food insecurity:     Worry: Not on file     Inability: Not on file    Transportation needs:     Medical: Not on file     Non-medical: Not on file   Tobacco Use    Smoking status: Never Smoker    Smokeless tobacco: Never Used   Substance and Sexual Activity    Alcohol use: No    Drug use: Not on file    Sexual activity: Not on file   Lifestyle    Physical activity:     Days per week: Not on file     Minutes per session: Not on file    Stress: Not on file   Relationships    Social connections:     Talks on phone: Not on file     Gets together: Not on file     Attends Mos[employer] service: Not on file     Active member of club or organization: Not on file     Attends meetings of clubs or organizations: Not on file     Relationship status: Not on file    Intimate partner violence:     Fear of current or ex partner: Not on file     Emotionally abused: Not on file     Physically abused: Not on file     Forced sexual activity: Not on file   Other Topics Concern    Not on file   Social History Narrative    Not on file         ALLERGIES: Biaxin [clarithromycin]; Levaquin [levofloxacin]; Naprosyn [naproxen]; Pcn [penicillins]; and Tequin [gatifloxacin]    Review of Systems   Constitutional: Positive for fever. HENT: Negative for sore throat. Eyes: Negative for visual disturbance. Respiratory: Negative for cough. Cardiovascular: Negative for chest pain. Gastrointestinal: Negative for abdominal pain. Genitourinary: Negative for dysuria. Musculoskeletal: Positive for myalgias. Negative for back pain. Skin: Negative for rash. Neurological: Positive for headaches. Vitals:    10/08/19 2200 10/08/19 2215 10/08/19 2312 10/08/19 2315   BP: 107/52 106/72 114/58 106/56   Pulse:       Resp:       Temp:       SpO2: 95% 97% 95% 96%   Weight:       Height:                Physical Exam   Constitutional: She appears well-developed and well-nourished. No distress. HENT:   Head: Normocephalic and atraumatic. Mouth/Throat: Oropharynx is clear and moist.   Eyes: Conjunctivae and EOM are normal.   Neck: Normal range of motion and phonation normal. Neck supple. Cardiovascular: Normal rate and intact distal pulses. Pulmonary/Chest: Effort normal. No respiratory distress. She has no wheezes. She has no rales. Abdominal: Soft. Bowel sounds are normal. She exhibits no distension and no mass. There is no tenderness. There is no rebound, no guarding, no CVA tenderness, no tenderness at McBurney's point and negative Hanna's sign. Musculoskeletal: Normal range of motion. She exhibits no tenderness. Neurological: She is alert. She is not disoriented. She exhibits normal muscle tone. Skin: Skin is warm and dry. Capillary refill takes less than 2 seconds. Nursing note and vitals reviewed. [facility]     11:41 PM  PAtient reassessed and remains afebrile, in no distress. LActic acid normal.  WBC 20.4, CMP normal.  UA normal. Blood and urine cultures obtained but no source of infection found other than UA treated 2 days ago. CXR normal.  Will discharge home. Will follow-up cultures. PAtient to return if new or worsened symptoms.    Procedures

## 2019-10-10 LAB
BACTERIA SPEC CULT: NORMAL
CC UR VC: NORMAL
SERVICE CMNT-IMP: NORMAL

## 2019-10-14 LAB
BACTERIA SPEC CULT: NORMAL
SERVICE CMNT-IMP: NORMAL

## 2020-01-07 ENCOUNTER — HOSPITAL ENCOUNTER (EMERGENCY)
Age: 60
Discharge: HOME | End: 2020-01-07
Attending: EMERGENCY MEDICINE
Payer: COMMERCIAL

## 2020-01-07 VITALS
OXYGEN SATURATION: 100 % | RESPIRATION RATE: 16 BRPM | HEART RATE: 85 BPM | DIASTOLIC BLOOD PRESSURE: 62 MMHG | SYSTOLIC BLOOD PRESSURE: 126 MMHG | TEMPERATURE: 98 F

## 2020-01-07 DIAGNOSIS — R79.1 ELEVATED INR: Primary | ICD-10-CM

## 2020-01-07 LAB
INR PPP: 7 (ref 0.9–1.1)
PROTHROMBIN TIME: 67.1 SEC (ref 9–11.1)

## 2020-01-07 PROCEDURE — 99283 EMERGENCY DEPT VISIT LOW MDM: CPT

## 2020-01-07 PROCEDURE — 85610 PROTHROMBIN TIME: CPT

## 2020-01-07 PROCEDURE — 36415 COLL VENOUS BLD VENIPUNCTURE: CPT

## 2020-01-07 PROCEDURE — 74011250637 HC RX REV CODE- 250/637: Performed by: EMERGENCY MEDICINE

## 2020-01-07 RX ADMIN — PHYTONADIONE 2.5 MG: 10 INJECTION, EMULSION INTRAMUSCULAR; INTRAVENOUS; SUBCUTANEOUS at 23:15

## 2020-01-08 NOTE — DISCHARGE INSTRUCTIONS
Patient Education     High INR Test Result: Care Instructions  Your Care Instructions  You had a blood test to check how long it takes your blood to clot. This test is called a PT or prothrombin time test. The result of the test is called the INR level. A high INR level can happen when you take warfarin (Coumadin). Warfarin helps prevent blood clots. To do this, it slows the amount of time it takes for your blood to clot. This raises your INR level. The INR goal for people who take warfarin is usually from 2 to 3.5. A value higher than 3.5 increases the risk of bleeding problems. Many things can affect the way warfarin works. Some natural health products and other medicines can make warfarin work too well. That can raise the risk of bleeding. If you drink a lot of alcohol, that may raise your INR. And severe diarrhea or vomiting can also raise your INR. The best way to lower your INR will depend on several things. In some cases, the doctor may have you stop taking warfarin for a few days. You may also be given other medicines to take. You will need to be tested often to make sure your INR level is going down. You will also need to watch for signs of bleeding. The doctor has checked you carefully, but problems can develop later. If you notice any problems or new symptoms, get medical treatment right away. Follow-up care is a key part of your treatment and safety. Be sure to make and go to all appointments, and call your doctor if you are having problems. It's also a good idea to know your test results and keep a list of the medicines you take. How can you care for yourself at home? Be careful with medicines and foods  · Don't start or stop taking any medicines, vitamins, or natural remedies unless you first talk to your doctor. · Keep the amount of vitamin K in your diet about the same from day to day. Do not suddenly eat a lot more or a lot less food that is rich in vitamin K than you usually do.  Vitamin K affects how warfarin works and how your blood clots. · Avoid cranberry juice and other cranberry products. They can increase the effects of warfarin. · Limit your use of alcohol. Avoid bleeding by preventing falls  · Wear slippers or shoes with nonskid soles. · Remove throw rugs and clutter. · Rearrange furniture and electrical cords to keep them out of walking paths. · Keep stairways, porches, and outside walkways well lit. Use night-lights in hallways and bathrooms. · Be extra careful when you work with sharp tools or knives. When should you call for help? Call 911 anytime you think you may need emergency care. For example, call if:  · You have a sudden, severe headache that is different from past headaches. Call your doctor now or seek immediate medical care if:  · You have any abnormal bleeding, such as:  ¨ Nosebleeds. ¨ Vaginal bleeding that is different (heavier, more frequent, at a different time of the month) than what you are used to. ¨ Bloody or black stools, or rectal bleeding. ¨ Bloody or pink urine. Watch closely for changes in your health, and be sure to contact your doctor if you have any problems. Where can you learn more? Go to Trendabl.be  Enter C178 in the search box to learn more about \"High INR Test Result: Care Instructions. \"   © 5037-4423 [employer], Incorporated. Care instructions adapted under license by [facility] (which disclaims liability or warranty for this information). This care instruction is for use with your licensed healthcare professional. If you have questions about a medical condition or this instruction, always ask your healthcare professional. [place] [address] any warranty or liability for your use of this information.   Content Version: 59.8.351030; Current as of: November 20, 2015

## 2020-01-08 NOTE — ED PROVIDER NOTES
Ms. Lianet Siddiqi is a 15-year-old female who presents to the ER from her PCP for elevated INR. She is on Coumadin for a PE is. She had her INR checked around [place]. Her INR was 3.0. Therefore, I told her to come back and have it checked again. She went in yesterday. They called her with results today and said her INR was 6.7. She said that she does not believe it because has been high in a long time. She called her hematologist.  The patient said the hematologist did not seem that worried about it but that was fine that she come to the ER to have it rechecked. [facility]. She denies any bleeding symptoms. No blood in her stool no black stool. No bloody noses. She denies any other complaints. She reports that she has not had any changes in any medications recently. She is not had her Coumadin dose changed recently.            Past Medical History:   Diagnosis Date    Diabetes ([address]     pre-diabetic    GERD (gastroesophageal reflux disease)     Hypertension     Other ill-defined conditions(799.89)     environmental allergies - oak, pollen, dust, mold    Other ill-defined conditions(799.89)     clotting disorder - makes too much factor 8    Thromboembolus ([address]     PE x 3 - all at once    Thrombophilia ([address]     Thyroid disease        Past Surgical History:   Procedure Laterality Date    HX APPENDECTOMY  1993    HX GYN      lysis of adhesions 3-4 x for endometriosis    HX HYSTERECTOMY  1994    with oophorectomy    HX OTHER SURGICAL  2010    EGD - h.pylori/colon - diverticulosis    HX OTHER SURGICAL      surgery at 6 wks of age for ?plyoric stenosis         Family History:   Problem Relation Age of Onset    Kidney Disease Mother         failure    Diabetes Mother        Social History     Socioeconomic History    Marital status:      Spouse name: Not on file    Number of children: Not on file    Years of education: Not on file    Highest education level: Not on file   Occupational History    Not on file   Social Needs    Financial resource strain: Not on file    Food insecurity:     Worry: Not on file     Inability: Not on file    Transportation needs:     Medical: Not on file     Non-medical: Not on file   Tobacco Use    Smoking status: Never Smoker    Smokeless tobacco: Never Used   Substance and Sexual Activity    Alcohol use: No    Drug use: Not on file    Sexual activity: Not on file   Lifestyle    Physical activity:     Days per week: Not on file     Minutes per session: Not on file    Stress: Not on file   Relationships    Social connections:     Talks on phone: Not on file     Gets together: Not on file     Attends [employer] service: Not on file     Active member of club or organization: Not on file     Attends meetings of clubs or organizations: Not on file     Relationship status: Not on file    Intimate partner violence:     Fear of current or ex partner: Not on file     Emotionally abused: Not on file     Physically abused: Not on file     Forced sexual activity: Not on file   Other Topics Concern    Not on file   Social History Narrative    Not on file         ALLERGIES: Biaxin [clarithromycin]; Levaquin [levofloxacin]; Naprosyn [naproxen]; Pcn [penicillins]; and Tequin [gatifloxacin]    Review of Systems   Constitutional: Negative for chills and fever. HENT: Negative for rhinorrhea and sore throat. Respiratory: Negative for cough and shortness of breath. Cardiovascular: Negative for chest pain. Gastrointestinal: Negative for abdominal pain, diarrhea, nausea and vomiting. Genitourinary: Negative for dysuria and urgency. Musculoskeletal: Negative for arthralgias and back pain. Skin: Negative for rash. Neurological: Negative for dizziness, weakness and light-headedness.        Vitals:    01/07/20 2150   BP: 133/85   Pulse: 84   Resp: 18   Temp: 98 °F (36.7 °C)   SpO2: 96%            Physical Exam     Const: No acute distress, well developed, well nourished  Head:  Atraumatic, normocephalic  Eyes:  PERRL, conjunctiva normal, no scleral icterus  Neck:  Supple, trachea midline  Cardiovascular:  Regular rate  Resp:  No resp distress, no increased work of breathing  Abd:  non-distended  :  Deferred  MSK:  No pedal edema, normal ROM  Neuro:  Alert and oriented x3, no cranial nerve defect  Skin:  Warm, dry, intact  Psych: normal mood and affect, behavior is normal, judgement and thought content is normal        MDM  Number of Diagnoses or Management Options  Elevated INR:      Amount and/or Complexity of Data Reviewed  Clinical lab tests: ordered and reviewed  Review and summarize past medical records: yes    Patient Progress  Patient progress: stable          Ms. Andrew Cordova is a 62 yo female who presents to the ER with an elevated INR. No bleeding sx. Her INR was 7.0. I spoke with the [facility] heme doctor (that is where she goes). They recommend giving 2.5 of vitamin k and f/u. She agrees to have her INR rechecked in 2 days. She will stop the coumadin until she sees here heme doctor. Pt. To return to the ER with bleeding or any other concerns.         Procedures

## 2020-01-08 NOTE — ED TRIAGE NOTES
Patient arrives ambulatory in [facility] for, \"My pcp called me today and said I needed to have my INR redrawn because it was at a critical level. \"     PT- 58  INR- 6. 7

## 2020-02-24 ENCOUNTER — HOSPITAL ENCOUNTER (EMERGENCY)
Age: 60
Discharge: HOME | End: 2020-02-24
Attending: EMERGENCY MEDICINE
Payer: COMMERCIAL

## 2020-02-24 VITALS
RESPIRATION RATE: 16 BRPM | DIASTOLIC BLOOD PRESSURE: 52 MMHG | TEMPERATURE: 98.1 F | OXYGEN SATURATION: 100 % | SYSTOLIC BLOOD PRESSURE: 110 MMHG | HEART RATE: 68 BPM

## 2020-02-24 DIAGNOSIS — Z79.01 ANTICOAGULANT LONG-TERM USE: ICD-10-CM

## 2020-02-24 DIAGNOSIS — H66.92 LEFT OTITIS MEDIA, UNSPECIFIED OTITIS MEDIA TYPE: ICD-10-CM

## 2020-02-24 DIAGNOSIS — H10.9 CONJUNCTIVITIS OF BOTH EYES, UNSPECIFIED CONJUNCTIVITIS TYPE: Primary | ICD-10-CM

## 2020-02-24 LAB — INR BLD: 2.7 (ref 0.9–1.2)

## 2020-02-24 PROCEDURE — 85610 PROTHROMBIN TIME: CPT

## 2020-02-24 PROCEDURE — 99284 EMERGENCY DEPT VISIT MOD MDM: CPT

## 2020-02-24 RX ORDER — CIPROFLOXACIN HYDROCHLORIDE 3.5 MG/ML
1 SOLUTION/ DROPS TOPICAL 4 TIMES DAILY
Qty: 2.5 ML | Refills: 0 | Status: SHIPPED | OUTPATIENT
Start: 2020-02-24 | End: 2020-03-31

## 2020-02-24 RX ORDER — CEFDINIR 300 MG/1
300 CAPSULE ORAL 2 TIMES DAILY
Qty: 14 CAP | Refills: 0 | Status: SHIPPED | OUTPATIENT
Start: 2020-02-24 | End: 2020-03-02

## 2020-02-24 NOTE — LETTER
[facility], [place], operated by [facility] 
[facility] 
[address] [address] [phone] Work/School Note Date: 2/24/2020 To Whom It May concern: 
 
Allan Arita was seen and treated today in the emergency room by the following provider(s): 
Attending Provider: Pallavi Galvez MD. Allan Arita may return to work on 11/25/2020.  
 
Sincerely, 
 
 
 
 
Dana Veras MD

## 2020-02-24 NOTE — DISCHARGE INSTRUCTIONS
Patient Education        Pinkeye: Care Instructions  Your Care Instructions    Pinkeye is redness and swelling of the eye surface and the conjunctiva (the lining of the eyelid and the covering of the white part of the eye). Pinkeye is also called conjunctivitis. Pinkeye is often caused by infection with bacteria or a virus. Dry air, allergies, smoke, and chemicals are other common causes. Pinkeye often clears on its own in 7 to 10 days. Antibiotics only help if the pinkeye is caused by bacteria. Pinkeye caused by infection spreads easily. If an allergy or chemical is causing pinkeye, it will not go away unless you can avoid whatever is causing it. Follow-up care is a key part of your treatment and safety. Be sure to make and go to all appointments, and call your doctor if you are having problems. It's also a good idea to know your test results and keep a list of the medicines you take. How can you care for yourself at home? · Wash your hands often. Always wash them before and after you treat pinkeye or touch your eyes or face. · Use moist cotton or a clean, wet cloth to remove crust. Wipe from the inside corner of the eye to the outside. Use a clean part of the cloth for each wipe. · Put cold or warm wet cloths on your eye a few times a day if the eye hurts. · Do not wear contact lenses or eye makeup until the pinkeye is gone. Throw away any eye makeup you were using when you got pinkeye. Clean your contacts and storage case. If you wear disposable contacts, use a new pair when your eye has cleared and it is safe to wear contacts again. · If the doctor gave you antibiotic ointment or eyedrops, use them as directed. Use the medicine for as long as instructed, even if your eye starts looking better soon. Keep the bottle tip clean, and do not let it touch the eye area. · To put in eyedrops or ointment:  ? Tilt your head back, and pull your lower eyelid down with one finger. ?  Drop or squirt the medicine inside the lower lid. ? Close your eye for 30 to 60 seconds to let the drops or ointment move around. ? Do not touch the ointment or dropper tip to your eyelashes or any other surface. · Do not share towels, pillows, or washcloths while you have pinkeye. When should you call for help? Call your doctor now or seek immediate medical care if:    · You have pain in your eye, not just irritation on the surface.     · You have a change in vision or loss of vision.     · You have an increase in discharge from the eye.     · Your eye has not started to improve or begins to get worse within 48 hours after you start using antibiotics.     · Pinkeye lasts longer than 7 days.    Watch closely for changes in your health, and be sure to contact your doctor if you have any problems. Where can you learn more? Go to http://[facility] Institutekecia.info/. Enter Y392 in the search box to learn more about \"Pinkeye: Care Instructions. \"  Current as of: June 26, 2019  Content Version: 12.2  © 6963-0920 [employer]. Care instructions adapted under license by [employer] (which disclaims liability or warranty for this information). If you have questions about a medical condition or this instruction, always ask your healthcare professional. [address] any warranty or liability for your use of this information. Patient Education        Ear Infection (Otitis Media): Care Instructions  Your Care Instructions    An ear infection may start with a cold and affect the middle ear (otitis media). It can hurt a lot. Most ear infections clear up on their own in a couple of days. Most often you will not need antibiotics. This is because many ear infections are caused by a virus. Antibiotics don't work against a virus. Regular doses of pain medicines are the best way to reduce your fever and help you feel better. Follow-up care is a key part of your treatment and safety.  Be sure to make and go to all appointments, and call your doctor if you are having problems. It's also a good idea to know your test results and keep a list of the medicines you take. How can you care for yourself at home? · Take pain medicines exactly as directed. ? If the doctor gave you a prescription medicine for pain, take it as prescribed. ? If you are not taking a prescription pain medicine, take an over-the-counter medicine, such as acetaminophen (Tylenol), ibuprofen (Advil, Motrin), or naproxen (Aleve). Read and follow all instructions on the label. ? Do not take two or more pain medicines at the same time unless the doctor told you to. Many pain medicines have acetaminophen, which is Tylenol. Too much acetaminophen (Tylenol) can be harmful. · Plan to take a full dose of pain reliever before bedtime. Getting enough sleep will help you get better. · Try a warm, moist washcloth on the ear. It may help relieve pain. · If your doctor prescribed antibiotics, take them as directed. Do not stop taking them just because you feel better. You need to take the full course of antibiotics. When should you call for help? Call your doctor now or seek immediate medical care if:    · You have new or increasing ear pain.     · You have new or increasing pus or blood draining from your ear.     · You have a fever with a stiff neck or a severe headache.    Watch closely for changes in your health, and be sure to contact your doctor if:    · You have new or worse symptoms.     · You are not getting better after taking an antibiotic for 2 days. Where can you learn more? Go to http://kd-kecia.info/. Enter U289 in the search box to learn more about \"Ear Infection (Otitis Media): Care Instructions. \"  Current as of: October 21, 2018  Content Version: 12.2  © 9880-0416 [employer].  Care instructions adapted under license by [employer] (which disclaims liability or warranty for this information). If you have questions about a medical condition or this instruction, always ask your healthcare professional. [place] [address] any warranty or liability for your use of this information.

## 2020-02-24 NOTE — ED TRIAGE NOTES
TRIAGE NOTE: Pt feeling feverish since this morning, but hasn't checked her temperature (97.8). Complains of mild left ear pain, and also thought she conjunctivitis in her right eye.

## 2020-02-24 NOTE — ED PROVIDER NOTES
60-year-old female presents from home via private vehicle with a chief complaint of eye irritation and ear pain. Patient states he has been feeling ill for the past couple of days. She woke up yesterday morning with her eyes dried and crusted shut. Initially her right eye and then this morning it also involved her left eye as well. She is reported some left ear pain for the past couple of days. She has not taken any medications for the symptoms. She has had some body aches and chills as well but no measured temperature. She denies any cough, vomiting, diarrhea. She has a past medical history significant for diabetes, thromboembolism, thrombophilia. She is chronically on warfarin.            Past Medical History:   Diagnosis Date    Diabetes ([address]     pre-diabetic    GERD (gastroesophageal reflux disease)     Hypertension     Other ill-defined conditions(799.89)     environmental allergies - oak, pollen, dust, mold    Other ill-defined conditions(799.89)     clotting disorder - makes too much factor 8    Throm[facility] [address] [facility]     PE x 3 - all at once    Thrombophilia ([facility] [address] [facility]     Thyroid disease        Past Surgical History:   Procedure Laterality Date    HX APPENDECTOMY  1993    HX GYN      lysis of adhesions 3-4 x for endometriosis    HX HYSTERECTOMY  1994    with oophorectomy    HX OTHER SURGICAL  2010    EGD - h.pylori/colon - diverticulosis    HX OTHER SURGICAL      surgery at 6 wks of age for ?plyoric stenosis         Family History:   Problem Relation Age of Onset    Kidney Disease Mother         failure    Diabetes Mother        Social History     Socioeconomic History    Marital status:      Spouse name: Not on file    Number of children: Not on file    Years of education: Not on file    Highest education level: Not on file   Occupational History    Not on file   Social Needs    Financial resource strain: Not on file    Food insecurity:     Worry: Not on file     Inability: Not on file    Transportation needs:     Medical: Not on file     Non-medical: Not on file   Tobacco Use    Smoking status: Never Smoker    Smokeless tobacco: Never Used   Substance and Sexual Activity    Alcohol use: No    Drug use: Not on file    Sexual activity: Not on file   Lifestyle    Physical activity:     Days per week: Not on file     Minutes per session: Not on file    Stress: Not on file   Relationships    Social connections:     Talks on phone: Not on file     Gets together: Not on file     Attends Judaism service: Not on file     Active member of club or organization: Not on file     Attends meetings of clubs or organizations: Not on file     Relationship status: Not on file    Intimate partner violence:     Fear of current or ex partner: Not on file     Emotionally abused: Not on file     Physically abused: Not on file     Forced sexual activity: Not on file   Other Topics Concern    Not on file   Social History Narrative    Not on file         ALLERGIES: Biaxin [clarithromycin]; Levaquin [levofloxacin]; Naprosyn [naproxen]; Pcn [penicillins]; and Tequin [gatifloxacin]    Review of Systems   Constitutional: Negative for fever. HENT: Negative for facial swelling. Eyes: Positive for redness. Negative for visual disturbance. Respiratory: Negative for chest tightness. Cardiovascular: Negative for chest pain. Gastrointestinal: Negative for abdominal pain. Genitourinary: Negative for difficulty urinating and dysuria. Musculoskeletal: Negative for arthralgias. Skin: Negative for rash. Neurological: Negative for headaches. Hematological: Negative for adenopathy. Psychiatric/Behavioral: Negative for suicidal ideas. Vitals:    02/24/20 1526 02/24/20 1650   BP: (!) 136/113    Pulse: 70    Resp: 20    Temp: 97.8 °F (36.6 °C) 97.9 °F (36.6 °C)   SpO2: 99%             Physical Exam  Vitals signs and nursing note reviewed.    Constitutional:       General: She is not in acute distress. Appearance: She is well-developed. She is not diaphoretic. HENT:      Head: Normocephalic and atraumatic. Right Ear: Tympanic membrane normal.      Ears:      Comments: Left TM with some erythema and effusion. No rupture. Eyes:      General: No scleral icterus. Pupils: Pupils are equal, round, and reactive to light. Neck:      Musculoskeletal: Normal range of motion and neck supple. Thyroid: No thyromegaly. Cardiovascular:      Rate and Rhythm: Normal rate and regular rhythm. Heart sounds: Normal heart sounds. No murmur. Pulmonary:      Effort: Pulmonary effort is normal. No respiratory distress. Breath sounds: Normal breath sounds. Abdominal:      General: Bowel sounds are normal. There is no distension. Palpations: Abdomen is soft. Tenderness: There is no abdominal tenderness. Musculoskeletal: Normal range of motion. Skin:     General: Skin is warm and dry. Findings: No rash. Neurological:      Mental Status: She is alert and oriented to person, place, and time. MDM  Number of Diagnoses or Management Options  Anticoagulant long-term use:   Conjunctivitis of both eyes, unspecified conjunctivitis type:   Left otitis media, unspecified otitis media type:   Diagnosis management comments: Assessment: Symptoms and physical exam findings consistent with an otitis media. Eye exam is unremarkable but could be conjunctivitis given her complaints. Vital signs are stable. Will treat with antibiotic eyedrops and cefdinir for the ear infection. Patient to follow-up with her primary care doctor as needed. Return if any worsening symptoms.          Procedures

## 2020-02-24 NOTE — LETTER
[address] 
[facility] 
[address] [address] 
[phone] Work/School Note Date: 2/24/2020 To Whom It May concern: 
 
Kristina Butler was seen and treated today in the emergency room by the following provider(s): 
No providers found. Kristina Butler may return to work on 2/26/2020.  
 
Sincerely, 
 
 
 
 
Ramiro Fitzgerald RN

## 2020-03-31 ENCOUNTER — HOSPITAL ENCOUNTER (EMERGENCY)
Age: 60
Discharge: HOME | End: 2020-03-31
Attending: STUDENT IN AN ORGANIZED HEALTH CARE EDUCATION/TRAINING PROGRAM
Payer: COMMERCIAL

## 2020-03-31 ENCOUNTER — APPOINTMENT (OUTPATIENT)
Dept: GENERAL RADIOLOGY | Age: 60
End: 2020-03-31
Attending: STUDENT IN AN ORGANIZED HEALTH CARE EDUCATION/TRAINING PROGRAM
Payer: COMMERCIAL

## 2020-03-31 VITALS
TEMPERATURE: 97.8 F | RESPIRATION RATE: 14 BRPM | SYSTOLIC BLOOD PRESSURE: 129 MMHG | DIASTOLIC BLOOD PRESSURE: 82 MMHG | BODY MASS INDEX: 45.69 KG/M2 | WEIGHT: 267.64 LBS | HEIGHT: 64 IN | OXYGEN SATURATION: 98 % | HEART RATE: 65 BPM

## 2020-03-31 DIAGNOSIS — J06.9 UPPER RESPIRATORY TRACT INFECTION, UNSPECIFIED TYPE: ICD-10-CM

## 2020-03-31 DIAGNOSIS — R30.0 DYSURIA: Primary | ICD-10-CM

## 2020-03-31 DIAGNOSIS — Z20.822 EXPOSURE TO COVID-19 VIRUS: ICD-10-CM

## 2020-03-31 LAB
ALBUMIN SERPL-MCNC: 3.5 G/DL (ref 3.5–5)
ALBUMIN/GLOB SERPL: 0.8 {RATIO} (ref 1.1–2.2)
ALP SERPL-CCNC: 122 U/L (ref 45–117)
ALT SERPL-CCNC: 35 U/L (ref 12–78)
ANION GAP SERPL CALC-SCNC: 11 MMOL/L (ref 5–15)
APPEARANCE UR: CLEAR
AST SERPL-CCNC: 18 U/L (ref 15–37)
BACTERIA URNS QL MICRO: NEGATIVE /HPF
BASOPHILS # BLD: 0.1 K/UL (ref 0–0.1)
BASOPHILS NFR BLD: 1 % (ref 0–1)
BILIRUB SERPL-MCNC: 0.3 MG/DL (ref 0.2–1)
BILIRUB UR QL: NEGATIVE
BUN SERPL-MCNC: 12 MG/DL (ref 6–20)
BUN/CREAT SERPL: 16 (ref 12–20)
CALCIUM SERPL-MCNC: 9.3 MG/DL (ref 8.5–10.1)
CHLORIDE SERPL-SCNC: 103 MMOL/L (ref 97–108)
CO2 SERPL-SCNC: 28 MMOL/L (ref 21–32)
COLOR UR: ABNORMAL
COMMENT, HOLDF: NORMAL
CREAT SERPL-MCNC: 0.73 MG/DL (ref 0.55–1.02)
D DIMER PPP FEU-MCNC: <0.19 MG/L FEU (ref 0–0.65)
DIFFERENTIAL METHOD BLD: ABNORMAL
EOSINOPHIL # BLD: 0.5 K/UL (ref 0–0.4)
EOSINOPHIL NFR BLD: 4 % (ref 0–7)
EPITH CASTS URNS QL MICRO: ABNORMAL /LPF
ERYTHROCYTE [DISTWIDTH] IN BLOOD BY AUTOMATED COUNT: 16.9 % (ref 11.5–14.5)
FLUAV AG NPH QL IA: NEGATIVE
FLUBV AG NOSE QL IA: NEGATIVE
GLOBULIN SER CALC-MCNC: 4.5 G/DL (ref 2–4)
GLUCOSE SERPL-MCNC: 101 MG/DL (ref 65–100)
GLUCOSE UR STRIP.AUTO-MCNC: NEGATIVE MG/DL
HCT VFR BLD AUTO: 41.9 % (ref 35–47)
HGB BLD-MCNC: 13 G/DL (ref 11.5–16)
HGB UR QL STRIP: NEGATIVE
IMM GRANULOCYTES # BLD AUTO: 0.1 K/UL (ref 0–0.04)
IMM GRANULOCYTES NFR BLD AUTO: 1 % (ref 0–0.5)
INR PPP: 1.7 (ref 0.9–1.1)
KETONES UR QL STRIP.AUTO: NEGATIVE MG/DL
LEUKOCYTE ESTERASE UR QL STRIP.AUTO: ABNORMAL
LYMPHOCYTES # BLD: 3.5 K/UL (ref 0.8–3.5)
LYMPHOCYTES NFR BLD: 32 % (ref 12–49)
MCH RBC QN AUTO: 27.3 PG (ref 26–34)
MCHC RBC AUTO-ENTMCNC: 31 G/DL (ref 30–36.5)
MCV RBC AUTO: 88 FL (ref 80–99)
MONOCYTES # BLD: 1.3 K/UL (ref 0–1)
MONOCYTES NFR BLD: 12 % (ref 5–13)
NEUTS SEG # BLD: 5.6 K/UL (ref 1.8–8)
NEUTS SEG NFR BLD: 51 % (ref 32–75)
NITRITE UR QL STRIP.AUTO: NEGATIVE
NRBC # BLD: 0.03 K/UL (ref 0–0.01)
NRBC BLD-RTO: 0.3 PER 100 WBC
PH UR STRIP: 7.5 [PH] (ref 5–8)
PLATELET # BLD AUTO: 391 K/UL (ref 150–400)
PMV BLD AUTO: 10.6 FL (ref 8.9–12.9)
POTASSIUM SERPL-SCNC: 4.3 MMOL/L (ref 3.5–5.1)
PROT SERPL-MCNC: 8 G/DL (ref 6.4–8.2)
PROT UR STRIP-MCNC: NEGATIVE MG/DL
PROTHROMBIN TIME: 16.3 SEC (ref 9–11.1)
RBC # BLD AUTO: 4.76 M/UL (ref 3.8–5.2)
RBC #/AREA URNS HPF: ABNORMAL /HPF (ref 0–5)
SAMPLES BEING HELD,HOLD: NORMAL
SODIUM SERPL-SCNC: 142 MMOL/L (ref 136–145)
SP GR UR REFRACTOMETRY: 1.01 (ref 1–1.03)
TROPONIN I SERPL-MCNC: <0.05 NG/ML
UR CULT HOLD, URHOLD: NORMAL
UROBILINOGEN UR QL STRIP.AUTO: 0.2 EU/DL (ref 0.2–1)
WBC # BLD AUTO: 11.1 K/UL (ref 3.6–11)
WBC URNS QL MICRO: ABNORMAL /HPF (ref 0–4)

## 2020-03-31 PROCEDURE — 85025 COMPLETE CBC W/AUTO DIFF WBC: CPT

## 2020-03-31 PROCEDURE — 36415 COLL VENOUS BLD VENIPUNCTURE: CPT

## 2020-03-31 PROCEDURE — 85379 FIBRIN DEGRADATION QUANT: CPT

## 2020-03-31 PROCEDURE — 99284 EMERGENCY DEPT VISIT MOD MDM: CPT

## 2020-03-31 PROCEDURE — 71045 X-RAY EXAM CHEST 1 VIEW: CPT

## 2020-03-31 PROCEDURE — 84484 ASSAY OF TROPONIN QUANT: CPT

## 2020-03-31 PROCEDURE — 87086 URINE CULTURE/COLONY COUNT: CPT

## 2020-03-31 PROCEDURE — 81001 URINALYSIS AUTO W/SCOPE: CPT

## 2020-03-31 PROCEDURE — 87804 INFLUENZA ASSAY W/OPTIC: CPT

## 2020-03-31 PROCEDURE — 80053 COMPREHEN METABOLIC PANEL: CPT

## 2020-03-31 PROCEDURE — 85610 PROTHROMBIN TIME: CPT

## 2020-03-31 RX ORDER — NITROFURANTOIN 25; 75 MG/1; MG/1
100 CAPSULE ORAL 2 TIMES DAILY
Qty: 14 CAP | Refills: 0 | Status: SHIPPED | OUTPATIENT
Start: 2020-03-31 | End: 2020-04-07

## 2020-03-31 NOTE — DISCHARGE INSTRUCTIONS
Call your hematologist to discuss modifying your dose of Coumadin. Please do not go to work until you receive your results from your COVID-19 testing. If you have severe shortness of breath or any other new symptoms please return.

## 2020-03-31 NOTE — LETTER
[facility] 
[address] [address] [phone] Work/School Note Date: 4/2/2020 To Whom It May Concern: Ms. Mckenzie Wood test for SARS-CoV-2 performed on 3/31/2020 is NEGATIVE.  She may return to work IF she has no fever or cough as there is a 10% false negative result with the test. 
 
Sincerely, 
 
 
 
 
Kinsey Sanchez MD

## 2020-03-31 NOTE — ED TRIAGE NOTES
Pt who works in [employer] setting at [address] Appears anxious. Has had splenectomy. Feels like her lymph nodes are sore in neck, chills, cough, sob, L ear pain, and pain with urination. States she had contact with 2 pui in her unit, but they ruled out, and she handled telemetry box from another pt, but wore a mask the entire time.

## 2020-03-31 NOTE — ED PROVIDER NOTES
German Merritt is a 63 y.o. female with past medical history notable for PE approximately 15 years ago with subsequent discovery of coagulopathy requiring lifelong anticoagulation, took a \"half dose\" of her Coumadin 5 and 6 days ago, approximately 4 days ago she was handling medical equipment used on a patient was confirmed positive for COVID-19 disease, she works as a nurse at [employer] who is presenting with cough, mild shortness of breath, bilateral lower extremity swelling, subjective chills and fever. Denies lightheadedness, chest pain, pleurisy, nausea, vomiting, back pain. Is also had dysuria without vaginal discharge.                 Past Medical History:   Diagnosis Date    Diabetes ([address]     pre-diabetic    GERD (gastroesophageal reflux disease)     Hypertension     Other ill-defined conditions(799.89)     environmental allergies - oak, pollen, dust, mold    Other ill-defined conditions(799.89)     clotting disorder - makes too much factor 8    Thromboembol[address] ([facility] [address]     PE x 3 - all at once    Thrombophilia ([facility] [address]     Thyroid disease        Past Surgical History:   Procedure Laterality Date    HX APPENDECTOMY  1993    HX GYN      lysis of adhesions 3-4 x for endometriosis    HX HYSTERECTOMY  1994    with oophorectomy    HX OTHER SURGICAL  2010    EGD - h.pylori/colon - diverticulosis    HX OTHER SURGICAL      surgery at 6 wks of age for ?plyoric stenosis         Family History:   Problem Relation Age of Onset    Kidney Disease Mother         failure    Diabetes Mother        Social History     Socioeconomic History    Marital status:      Spouse name: Not on file    Number of children: Not on file    Years of education: Not on file    Highest education level: Not on file   Occupational History    Not on file   Social Needs    Financial resource strain: Not on file    Food insecurity     Worry: Not on file     Inability: Not on file   Ama[employer] Loser Transportation needs     Medical: Not on file     Non-medical: Not on file   Tobacco Use    Smoking status: Never Smoker    Smokeless tobacco: Never Used   Substance and Sexual Activity    Alcohol use: No    Drug use: Not on file    Sexual activity: Not on file   Lifestyle    Physical activity     Days per week: Not on file     Minutes per session: Not on file    Stress: Not on file   Relationships    Social connections     Talks on phone: Not on file     Gets together: Not on file     Attends Rastafarian service: Not on file     Active member of club or organization: Not on file     Attends meetings of clubs or organizations: Not on file     Relationship status: Not on file    Intimate partner violence     Fear of current or ex partner: Not on file     Emotionally abused: Not on file     Physically abused: Not on file     Forced sexual activity: Not on file   Other Topics Concern    Not on file   Social History Narrative    Not on file         ALLERGIES: Biaxin [clarithromycin]; Levaquin [levofloxacin]; Naprosyn [naproxen]; Pcn [penicillins]; and Tequin [gatifloxacin]    Review of Systems   Constitutional: Positive for chills and fatigue. Respiratory: Positive for cough and shortness of breath. Cardiovascular: Positive for leg swelling. Negative for chest pain and palpitations. Gastrointestinal: Negative for abdominal pain, nausea and vomiting. Musculoskeletal: Negative for back pain. Neurological: Negative for numbness and headaches. Psychiatric/Behavioral: Negative for confusion. All other systems reviewed and are negative. Vitals:    03/31/20 1223   BP: (!) 128/8   Pulse: 78   Resp: 16   Temp: 98 °F (36.7 °C)   SpO2: 97%   Weight: 121.4 kg (267 lb 10.2 oz)            Physical Exam  Vitals signs reviewed. Constitutional:       General: She is not in acute distress. HENT:      Head: Normocephalic and atraumatic. Right Ear: No drainage or swelling.       Left Ear: No drainage or swelling. Ears:        Mouth/Throat:      Mouth: Mucous membranes are moist.      Pharynx: Oropharynx is clear. Cardiovascular:      Rate and Rhythm: Normal rate and regular rhythm. Heart sounds: Normal heart sounds. Pulmonary:      Effort: Pulmonary effort is normal.      Breath sounds: Normal breath sounds. Abdominal:      Tenderness: There is no abdominal tenderness. There is no guarding or rebound. Musculoskeletal: Normal range of motion. Lymphadenopathy:      Comments: Trace pedal edema bilateral, things that nonpitting       Skin:     General: Skin is warm and dry. Capillary Refill: Capillary refill takes less than 2 seconds. Neurological:      General: No focal deficit present. Mental Status: She is alert and oriented to person, place, and time. Psychiatric:         Mood and Affect: Mood normal.          MDM  Number of Diagnoses or Management Options  Dysuria:   Exposure to Covid-19 Virus:   Upper respiratory tract infection, unspecified type:   Diagnosis management comments: 1. Patient with history of VTE presenting with mild lower extremity swelling as well as dyspnea in the setting of a cough. Low to moderate risk by symptoms. Subtherapeutic INR in the setting of decreasing her dose in anticipation of dental surgery. D-dimer however is negative, vital signs are reassuring. We will follow-up with her hematologist.  2.  Patient with possible COVID-19 exposure, discussed with infection control nurse, will send department public health lab for processing. Advised the patient to quarantine until this result is known. Likely low to moderate risk exposure of a confirmed positive patient reportedly.          Procedures

## 2020-03-31 NOTE — LETTER
[address] 
[facility] 
[address] [id number] [phone] Work/School Note Date: 3/31/2020 To Whom It May concern: 
 
Salvatore Jamison was seen and treated today in the emergency room by the following provider(s): 
Attending Provider: Ander Turcios MD. Salvatore Jamison may return to work on 4/2 if the results of COVID-19 testing is negative. Sincerely, Akanksha Schreiber MD

## 2020-04-01 ENCOUNTER — PATIENT OUTREACH (OUTPATIENT)
Dept: INTERNAL MEDICINE CLINIC | Age: 60
End: 2020-04-01

## 2020-04-01 LAB
BACTERIA SPEC CULT: NORMAL
EMERGENT DISEASE PANEL, EDPR: NOT DETECTED
SERVICE CMNT-IMP: NORMAL

## 2020-04-01 NOTE — PROGRESS NOTES
COVID-19 Screening Initial Follow-up Note    Patient contacted regarding [id number] exposure. Care Transition Nurse/ Ambulatory Care Manager contacted the patient by telephone to perform post discharge assessment. Verified name and  with patient as identifiers. Provided introduction to self, and explanation of the CTN/ACM role, and reason for call due to risk factors for infection and/or exposure to COVID-19. Symptoms reviewed with patient who verbalized the following symptoms: no new/worsening symptoms       Due to no new or worsening symptoms encounter was not routed to provider for escalation. Patient has following risk factors of: ED visit for viral symptoms. CTN/ACM reviewed discharge instructions, medical action plan and red flags such as increased shortness of breath, increasing fever and signs of decompensation with patient who verbalized understanding. Discussed exposure protocols and quarantine with CDC Guidelines What to do if you are sick with coronavirus disease 2019 Patient who was given an opportunity for questions and concerns. The patient agrees to contact the [facility] exposure line [phone], [facility]  ([phone] and PCP office for questions related to their healthcare. CTN/ACM provided contact information for future reference.     Reviewed and educated patient on any new and changed medications related to discharge diagnosis     Plan for follow-up call in 14 days based on severity of symptoms and risk factors    Sophia Maria RN  Ambulatory Care Manager

## 2020-04-02 NOTE — PROGRESS NOTES
4/4/2020 3:50 AM  The patient called [facility] ED for her COVID test result. She was informed that it was negative. She requested a copy of the results and a not to return to work. The note was written and will be kept with registration for her to pick it up at [facility].
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
## 2020-04-13 ENCOUNTER — APPOINTMENT (OUTPATIENT)
Dept: GENERAL RADIOLOGY | Age: 60
End: 2020-04-13
Attending: EMERGENCY MEDICINE
Payer: COMMERCIAL

## 2020-04-13 ENCOUNTER — HOSPITAL ENCOUNTER (EMERGENCY)
Age: 60
Discharge: HOME | End: 2020-04-13
Attending: EMERGENCY MEDICINE
Payer: COMMERCIAL

## 2020-04-13 VITALS
BODY MASS INDEX: 45.43 KG/M2 | TEMPERATURE: 97.5 F | DIASTOLIC BLOOD PRESSURE: 69 MMHG | RESPIRATION RATE: 14 BRPM | HEIGHT: 64 IN | WEIGHT: 266.1 LBS | SYSTOLIC BLOOD PRESSURE: 124 MMHG | HEART RATE: 73 BPM | OXYGEN SATURATION: 100 %

## 2020-04-13 DIAGNOSIS — R53.83 FATIGUE, UNSPECIFIED TYPE: Primary | ICD-10-CM

## 2020-04-13 DIAGNOSIS — M25.50 ARTHRALGIA, UNSPECIFIED JOINT: ICD-10-CM

## 2020-04-13 LAB
ALBUMIN SERPL-MCNC: 3.5 G/DL (ref 3.5–5)
ALBUMIN/GLOB SERPL: 0.8 {RATIO} (ref 1.1–2.2)
ALP SERPL-CCNC: 130 U/L (ref 45–117)
ALT SERPL-CCNC: 28 U/L (ref 12–78)
ANION GAP SERPL CALC-SCNC: 9 MMOL/L (ref 5–15)
APPEARANCE UR: CLEAR
AST SERPL-CCNC: 15 U/L (ref 15–37)
BACTERIA URNS QL MICRO: NEGATIVE /HPF
BASOPHILS # BLD: 0.1 K/UL (ref 0–0.1)
BASOPHILS NFR BLD: 1 % (ref 0–1)
BILIRUB SERPL-MCNC: 0.3 MG/DL (ref 0.2–1)
BILIRUB UR QL: NEGATIVE
BUN SERPL-MCNC: 14 MG/DL (ref 6–20)
BUN/CREAT SERPL: 18 (ref 12–20)
CALCIUM SERPL-MCNC: 9 MG/DL (ref 8.5–10.1)
CHLORIDE SERPL-SCNC: 104 MMOL/L (ref 97–108)
CO2 SERPL-SCNC: 28 MMOL/L (ref 21–32)
COLOR UR: NORMAL
CREAT SERPL-MCNC: 0.79 MG/DL (ref 0.55–1.02)
CRP SERPL HS-MCNC: >9.5 MG/L
DIFFERENTIAL METHOD BLD: ABNORMAL
EOSINOPHIL # BLD: 0.4 K/UL (ref 0–0.4)
EOSINOPHIL NFR BLD: 3 % (ref 0–7)
EPITH CASTS URNS QL MICRO: NORMAL /LPF
ERYTHROCYTE [DISTWIDTH] IN BLOOD BY AUTOMATED COUNT: 16.6 % (ref 11.5–14.5)
ERYTHROCYTE [SEDIMENTATION RATE] IN BLOOD: 21 MM/HR (ref 0–30)
GLOBULIN SER CALC-MCNC: 4.2 G/DL (ref 2–4)
GLUCOSE SERPL-MCNC: 131 MG/DL (ref 65–100)
GLUCOSE UR STRIP.AUTO-MCNC: NEGATIVE MG/DL
HCT VFR BLD AUTO: 41.2 % (ref 35–47)
HGB BLD-MCNC: 12.9 G/DL (ref 11.5–16)
HGB UR QL STRIP: NEGATIVE
IMM GRANULOCYTES # BLD AUTO: 0.1 K/UL (ref 0–0.04)
IMM GRANULOCYTES NFR BLD AUTO: 0 % (ref 0–0.5)
KETONES UR QL STRIP.AUTO: NEGATIVE MG/DL
LEUKOCYTE ESTERASE UR QL STRIP.AUTO: NEGATIVE
LYMPHOCYTES # BLD: 3.5 K/UL (ref 0.8–3.5)
LYMPHOCYTES NFR BLD: 31 % (ref 12–49)
MAGNESIUM SERPL-MCNC: 1.9 MG/DL (ref 1.6–2.4)
MCH RBC QN AUTO: 27.4 PG (ref 26–34)
MCHC RBC AUTO-ENTMCNC: 31.3 G/DL (ref 30–36.5)
MCV RBC AUTO: 87.7 FL (ref 80–99)
MONOCYTES # BLD: 1.3 K/UL (ref 0–1)
MONOCYTES NFR BLD: 11 % (ref 5–13)
NEUTS SEG # BLD: 6.2 K/UL (ref 1.8–8)
NEUTS SEG NFR BLD: 54 % (ref 32–75)
NITRITE UR QL STRIP.AUTO: NEGATIVE
NRBC # BLD: 0.03 K/UL (ref 0–0.01)
NRBC BLD-RTO: 0.3 PER 100 WBC
PH UR STRIP: 6.5 [PH] (ref 5–8)
PLATELET # BLD AUTO: 392 K/UL (ref 150–400)
PMV BLD AUTO: 10.3 FL (ref 8.9–12.9)
POTASSIUM SERPL-SCNC: 4 MMOL/L (ref 3.5–5.1)
PROT SERPL-MCNC: 7.7 G/DL (ref 6.4–8.2)
PROT UR STRIP-MCNC: NEGATIVE MG/DL
RBC # BLD AUTO: 4.7 M/UL (ref 3.8–5.2)
RBC #/AREA URNS HPF: NORMAL /HPF (ref 0–5)
SODIUM SERPL-SCNC: 141 MMOL/L (ref 136–145)
SP GR UR REFRACTOMETRY: 1.01 (ref 1–1.03)
UROBILINOGEN UR QL STRIP.AUTO: 0.2 EU/DL (ref 0.2–1)
WBC # BLD AUTO: 11.5 K/UL (ref 3.6–11)
WBC URNS QL MICRO: NORMAL /HPF (ref 0–4)

## 2020-04-13 PROCEDURE — 80053 COMPREHEN METABOLIC PANEL: CPT

## 2020-04-13 PROCEDURE — 85652 RBC SED RATE AUTOMATED: CPT

## 2020-04-13 PROCEDURE — 81001 URINALYSIS AUTO W/SCOPE: CPT

## 2020-04-13 PROCEDURE — 99284 EMERGENCY DEPT VISIT MOD MDM: CPT

## 2020-04-13 PROCEDURE — 71045 X-RAY EXAM CHEST 1 VIEW: CPT

## 2020-04-13 PROCEDURE — 85025 COMPLETE CBC W/AUTO DIFF WBC: CPT

## 2020-04-13 PROCEDURE — 86141 C-REACTIVE PROTEIN HS: CPT

## 2020-04-13 PROCEDURE — 83735 ASSAY OF MAGNESIUM: CPT

## 2020-04-13 PROCEDURE — 36415 COLL VENOUS BLD VENIPUNCTURE: CPT

## 2020-04-13 NOTE — DISCHARGE INSTRUCTIONS
Patient Education        Fatigue: Care Instructions  Your Care Instructions    Fatigue is a feeling of tiredness, exhaustion, or lack of energy. You may feel fatigue because of too much or not enough activity. It can also come from stress, lack of sleep, boredom, and poor diet. Many medical problems, such as viral infections, can cause fatigue. Emotional problems, especially depression, are often the cause of fatigue. Fatigue is most often a symptom of another problem. Treatment for fatigue depends on the cause. For example, if you have fatigue because you have a certain health problem, treating this problem also treats your fatigue. If depression or anxiety is the cause, treatment may help. Follow-up care is a key part of your treatment and safety. Be sure to make and go to all appointments, and call your doctor if you are having problems. It's also a good idea to know your test results and keep a list of the medicines you take. How can you care for yourself at home? · Get regular exercise. But don't overdo it. Go back and forth between rest and exercise. · Get plenty of rest.  · Eat a healthy diet. Do not skip meals, especially breakfast.  · Reduce your use of caffeine, tobacco, and alcohol. Caffeine is most often found in coffee, tea, cola drinks, and chocolate. · Limit medicines that can cause fatigue. This includes tranquilizers and cold and allergy medicines. When should you call for help? Watch closely for changes in your health, and be sure to contact your doctor if:    · You have new symptoms such as fever or a rash.     · Your fatigue gets worse.     · You have been feeling down, depressed, or hopeless. Or you may have lost interest in things that you usually enjoy.     · You are not getting better as expected. Where can you learn more? Go to http://kd-kecia.info/  Enter [id number] in the search box to learn more about \"Fatigue: Care Instructions. \"  Current as of: June 26, 2019Content Version: 12.4  © 3771-4429 Healthwise, Incorporated. Care instructions adapted under license by [employer] (which disclaims liability or warranty for this information). If you have questions about a medical condition or this instruction, always ask your healthcare professional. [address] any warranty or liability for your use of this information. Patient Education        Joint Pain: Care Instructions  Your Care Instructions    Many people have small aches and pains from overuse or injury to muscles and joints. Joint injuries often happen during sports or recreation, work tasks, or projects around the home. An overuse injury can happen when you put too much stress on a joint or when you do an activity that stresses the joint over and over, such as using the computer or rowing a boat. You can take action at home to help your muscles and joints get better. You should feel better in 1 to 2 weeks, but it can take 3 months or more to heal completely. Follow-up care is a key part of your treatment and safety. Be sure to make and go to all appointments, and call your doctor if you are having problems. It's also a good idea to know your test results and keep a list of the medicines you take. How can you care for yourself at home? · Do not put weight on the injured joint for at least a day or two. · For the first day or two after an injury, do not take hot showers or baths, and do not use hot packs. The heat could make swelling worse. · Put ice or a cold pack on the sore joint for 10 to 20 minutes at a time. Try to do this every 1 to 2 hours for the next 3 days (when you are awake) or until the swelling goes down. Put a thin cloth between the ice and your skin. · Wrap the injury in an elastic bandage. Do not wrap it too tightly because this can cause more swelling.   · Prop up the sore joint on a pillow when you ice it or anytime you sit or lie down during the next 3 days. Try to keep it above the level of your heart. This will help reduce swelling. · Take an over-the-counter pain medicine, such as acetaminophen (Tylenol), ibuprofen (Advil, Motrin), or naproxen (Aleve). Read and follow all instructions on the label. · After 1 or 2 days of rest, begin moving the joint gently. While the joint is still healing, you can begin to exercise using activities that do not strain or hurt the painful joint. When should you call for help? Call your doctor now or seek immediate medical care if:    · You have signs of infection, such as:  ? Increased pain, swelling, warmth, and redness. ? Red streaks leading from the joint. ? A fever.    Watch closely for changes in your health, and be sure to contact your doctor if:    · Your movement or symptoms are not getting better after 1 to 2 weeks of home treatment. Where can you learn more? Go to http://kd-kecia.info/  Enter P205 in the search box to learn more about \"Joint Pain: Care Instructions. \"  Current as of: June 26, 2019Content Version: 12.4  © 8197-7519 Healthwise, Incorporated. Care instructions adapted under license by [employer] (which disclaims liability or warranty for this information). If you have questions about a medical condition or this instruction, always ask your healthcare professional. [address] any warranty or liability for your use of this information.

## 2020-04-13 NOTE — LETTER
[address] 
[facility] 
[address] [address] [phone] Work/School Note Date: 4/13/2020 To Whom It May concern: 
 
Steve Gilman was seen and treated today. Steve Gilman may return to work on 04/15/2020.  
 
Sincerely, 
 
 
 
 
Agustín Rosas MD

## 2020-04-13 NOTE — ED PROVIDER NOTES
HPI     Pt is a 63 y.o. F with PMH of DM, HTN, GERD, clotting disorder on coumadin, and hx of splenectomy here with c/o fatigue and arthralgia. Pt was seen on 3/31/2020 for possible COVID. She did test negative. She followed up with her PCP at [facility] twice and has had increasing WBC thus they advised her to come to ED. All of her previous symptoms have resolved and now she only c/o fatigue and joint pain. She does have dry cough also. No fever. No shortness of breath. No other complaints at this time.       Past Medical History:   Diagnosis Date    Diabetes ([address]     pre-diabetic    GERD (gastroesophageal reflux disease)     Hypertension     Other ill-defined conditions(799.89)     environmental allergies - oak, pollen, dust, mold    Other ill-defined conditions(799.89)     clotting disorder - makes too much factor 8    Thromboembolus ([address]     PE x 3 - all at once    Thromb[facility]     Thyroid disease        Past Surgical History:   Procedure Laterality Date    HX APPENDECTOMY  1993    HX GYN      lysis of adhesions 3-4 x for endometriosis    HX HYSTERECTOMY  1994    with oophorectomy    HX OTHER SURGICAL  2010    EGD - h.pylori/colon - diverticulosis    HX OTHER SURGICAL      surgery at 6 wks of age for ?plyoric stenosis         Family History:   Problem Relation Age of Onset    Kidney Disease Mother         failure    Diabetes Mother        Social History     Socioeconomic History    Marital status:      Spouse name: Not on file    Number of children: Not on file    Years of education: Not on file    Highest education level: Not on file   Occupational History    Not on file   Social Needs    Financial resource strain: Not on file    Food insecurity     Worry: Not on file     Inability: Not on file    Transportation needs     Medical: Not on file     Non-medical: Not on file   Tobacco Use    Smoking status: Never Smoker    Smokeless tobacco: Never Used Substance and Sexual Activity    Alcohol use: No    Drug use: Never    Sexual activity: Not on file   Lifestyle    Physical activity     Days per week: Not on file     Minutes per session: Not on file    Stress: Not on file   Relationships    Social connections     Talks on phone: Not on file     Gets together: Not on file     Attends Zoroastrianism service: Not on file     Active member of club or organization: Not on file     Attends meetings of clubs or organizations: Not on file     Relationship status: Not on file    Intimate partner violence     Fear of current or ex partner: Not on file     Emotionally abused: Not on file     Physically abused: Not on file     Forced sexual activity: Not on file   Other Topics Concern    Not on file   Social History Narrative    Not on file         ALLERGIES: Biaxin [clarithromycin]; Cephalexin; Ciprofloxacin; Levaquin [levofloxacin]; Naprosyn [naproxen]; Pcn [penicillins]; and Tequin [gatifloxacin]    Review of Systems   Constitutional: Positive for fatigue. Negative for chills, diaphoresis and fever. HENT: Negative for congestion and trouble swallowing. Eyes: Negative for photophobia and visual disturbance. Respiratory: Positive for cough. Negative for chest tightness and shortness of breath. Cardiovascular: Negative for chest pain, palpitations and leg swelling. Gastrointestinal: Negative for abdominal pain, diarrhea, nausea and vomiting. Genitourinary: Negative for difficulty urinating, dysuria, flank pain and frequency. Musculoskeletal: Negative for back pain and myalgias. Skin: Negative for rash and wound. Neurological: Negative for dizziness, weakness, light-headedness and headaches. Hematological: Negative for adenopathy. Does not bruise/bleed easily. Psychiatric/Behavioral: Negative for agitation and confusion. All other systems reviewed and are negative.       Vitals:    04/13/20 1351   BP: (!) 144/105   Pulse: 75   Resp: 16   Temp: 98.7 °F (37.1 °C)   SpO2: 100%            Physical Exam  Vitals signs and nursing note reviewed. Constitutional:       General: She is not in acute distress. Appearance: She is well-developed. She is not diaphoretic. HENT:      Head: Normocephalic. Eyes:      Conjunctiva/sclera: Conjunctivae normal.      Pupils: Pupils are equal, round, and reactive to light. Neck:      Musculoskeletal: Normal range of motion and neck supple. Vascular: No JVD. Cardiovascular:      Rate and Rhythm: Normal rate and regular rhythm. Heart sounds: Normal heart sounds. Pulmonary:      Effort: Pulmonary effort is normal.      Breath sounds: Normal breath sounds. Abdominal:      General: Bowel sounds are normal. There is no distension. Palpations: Abdomen is soft. Tenderness: There is no abdominal tenderness. Musculoskeletal: Normal range of motion. General: No tenderness or deformity. Lymphadenopathy:      Cervical: No cervical adenopathy. Skin:     General: Skin is warm and dry. Capillary Refill: Capillary refill takes less than 2 seconds. Findings: No erythema or rash. Neurological:      Mental Status: She is alert and oriented to person, place, and time. Cranial Nerves: No cranial nerve deficit. Sensory: No sensory deficit. MDM       Procedures      Pt advised to follow up with oncology, PCP, and infection dz regarding intermittent leukocytosis with hx of splenectomy. Return precautions given. 4:12 PM  Patient's results have been reviewed with them. Patient and/or family have verbally conveyed their understanding and agreement of the patient's signs, symptoms, diagnosis, treatment and prognosis and additionally agree to follow up as recommended or return to the Emergency Room should their condition change prior to follow-up.   Discharge instructions have also been provided to the patient with some educational information regarding their diagnosis as well a list of reasons why they would want to return to the ER prior to their follow-up appointment should their condition change.     Nichole Langford MD

## 2020-04-13 NOTE — ED TRIAGE NOTES
Seen here on 3/31 with neg COVID test.  Had f/u with family dr, and elevated wbc on 2 occasions. Very fatigued on Saturday, also c/o joint pain today.   Hx of splenectomy

## 2020-04-14 ENCOUNTER — PATIENT OUTREACH (OUTPATIENT)
Dept: CASE MANAGEMENT | Age: 60
End: 2020-04-14

## 2020-04-14 NOTE — PROGRESS NOTES
ACM attempted to reach patient for ER follow up. Phone was picked up and hung up with no answer. Will try back later this afternoon    11:45 AM     Patient contacted regarding recent discharge and COVID-19 risk   Care Transition Nurse/ Ambulatory Care Manager contacted the patient by telephone to perform post discharge assessment. Verified name and  with patient as identifiers. Patient has following risk factors of: immunocompromised- history of splenectomy . CTN/ACM reviewed discharge instructions, medical action plan and red flags related to discharge diagnosis. Reviewed and educated them on any new and changed medications related to discharge diagnosis. Advised obtaining a 90-day supply of all daily and as-needed medications. Education provided regarding infection prevention, and signs and symptoms of COVID-19 and when to seek medical attention with patient who verbalized understanding. Discussed exposure protocols and quarantine from [address] you at higher risk for severe illness  and given an opportunity for questions and concerns. The patient agrees to contact the COVID-19 hotline [phone] or PCP office for questions related to their healthcare. CTN/ACM provided contact information for future reference. From CDC: Are you at higher risk for severe illness?  Wash your hands often.  Avoid close contact (6 feet, which is about two arm lengths) with people who are sick.  Put distance between yourself and other people if COVID-19 is spreading in your community.  Clean and disinfect frequently touched surfaces.  Avoid all cruise travel and non-essential air travel.  Call your healthcare professional if you have concerns about COVID-19 and your underlying condition or if you are sick.     For more information on steps you can take to protect yourself, see CDC's How to Protect Yourself      Patient/family/caregiver given information for [facility] and agrees to enroll no  Patient's preferred e-mail:    Patient's preferred phone number:   Based on Loop alert triggers, patient will be contacted by nurse care manager for worsening symptoms. Plan for follow-up call in 14 days based on severity of symptoms and risk factors    ACM spoke with patient for follow up. Patient declines needing any education on [employer] [address] [facility]. 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 Patient reports plans to follow up with [facility] (her PCP) and her hematologist re leukocytosis. Patient has no concerns at this time . Only notes she wishes she would have had coags done in the ER as she is due for them for Coumadin and now has to go into the lab for this next week. Discussed considering [employer] home monitoring.  Patient notes appreciation for the call

## 2020-04-15 ENCOUNTER — PATIENT OUTREACH (OUTPATIENT)
Dept: INTERNAL MEDICINE CLINIC | Age: 60
End: 2020-04-15

## 2020-04-15 NOTE — PROGRESS NOTES
Patient resolved from Transition of Care episode on 4/15/20  Patient/family has been provided the following resources and education related to COVID-19:                         Signs, symptoms and red flags related to COVID-19            CDC exposure and quarantine guidelines            [facility] exposure contact - [phone]            Contact for their local Department of Health                 Patient currently reports that the following symptoms have improved:  unable to reach patient. LVM for patient to return call if having symptoms of COVID-19. Contact number supplied     No further outreach scheduled with this ACM. Episode of Care resolved. Patient has this CTN/ACM contact information if future needs arise.     Mayuri Poon, RN  Ambulatory Care Manager

## 2020-04-29 ENCOUNTER — PATIENT OUTREACH (OUTPATIENT)
Dept: CASE MANAGEMENT | Age: 60
End: 2020-04-29

## 2020-05-18 ENCOUNTER — HOSPITAL ENCOUNTER (EMERGENCY)
Age: 60
Discharge: LEFT BEFORE DISCHARGE | End: 2020-05-18
Attending: EMERGENCY MEDICINE

## 2020-05-18 ENCOUNTER — HOSPITAL ENCOUNTER (OUTPATIENT)
Dept: ULTRASOUND IMAGING | Age: 60
Discharge: HOME | End: 2020-05-18
Payer: COMMERCIAL

## 2020-05-18 DIAGNOSIS — M79.662 PAIN OF LEFT CALF: ICD-10-CM

## 2020-05-18 PROCEDURE — 93971 EXTREMITY STUDY: CPT

## 2020-09-09 ENCOUNTER — OFFICE VISIT (OUTPATIENT)
Dept: NEUROLOGY | Facility: CLINIC | Age: 60
End: 2020-09-09
Payer: COMMERCIAL

## 2020-09-09 VITALS
HEIGHT: 64 IN | DIASTOLIC BLOOD PRESSURE: 84 MMHG | HEART RATE: 68 BPM | BODY MASS INDEX: 45.41 KG/M2 | WEIGHT: 266 LBS | SYSTOLIC BLOOD PRESSURE: 130 MMHG | RESPIRATION RATE: 16 BRPM | OXYGEN SATURATION: 92 %

## 2020-09-09 DIAGNOSIS — M47.817 LUMBAR AND SACRAL SPONDYLARTHRITIS: Primary | ICD-10-CM

## 2020-09-09 DIAGNOSIS — M79.605 PAIN OF LEFT LOWER EXTREMITY: ICD-10-CM

## 2020-09-09 PROCEDURE — 99204 OFFICE O/P NEW MOD 45 MIN: CPT | Performed by: PSYCHIATRY & NEUROLOGY

## 2020-09-09 NOTE — PATIENT INSTRUCTIONS
PRESCRIPTION REFILL POLICY [facility] Statement to Patients April 1, 2014 In an effort to ensure the large volume of patient prescription refills is processed in the most efficient and expeditious manner, we are asking our patients to assist us by calling your Pharmacy for all prescription refills, this will include also your  Mail Order Pharmacy. The pharmacy will contact our office electronically to continue the refill process. Please do not wait until the last minute to call your pharmacy. We need at least 48 hours (2days) to fill prescriptions. We also encourage you to call your pharmacy before going to  your prescription to make sure it is ready. With regard to controlled substance prescription refill requests (narcotic refills) that need to be picked up at our office, we ask your cooperation by providing us with at least 72 hours (3days) notice that you will need a refill. We will not refill narcotic prescription refill requests after 4:00pm on any weekday, Monday through Thursday, or after 2:00pm on Fridays, or on the weekends. We encourage everyone to explore another way of getting your prescription refill request processed using [employer], our patient web portal through our electronic medical record system. [employer] is an efficient and effective way to communicate your medication request directly to the office and  downloadable as an nato on your smart phone . [employer] also features a review functionality that allows you to view your medication list as well as leave messages for your physician. Are you ready to get connected? If so please review the attatched instructions or speak to any of our staff to get you set up right away! Thank you so much for your cooperation. Should you have any questions please contact our Practice Administrator. The Physicians and Staff,  [facility]

## 2020-09-09 NOTE — PROGRESS NOTES
Ms. Natalio Durant presents as a new patient for evaluation of back and LLE pain. Patient was referred by [employer]. Depression screening done on patient.

## 2020-09-09 NOTE — LETTER
9/9/2020 10:57 AM 
 
Patient:  Akash Kohli YOB: 1960 Date of Visit: 9/9/2020 Dear Gaye Thomas [facility] [address] 
[address] [facility] [address] VIA Facsimile: [phone] Amanda Jacobs DO 
[address] [facility] [address] VIA Facsimile: [phone]: Thank you for referring Ms. Parisa Huang to me for evaluation/treatment. Below are the relevant portions of my assessment and plan of care. If you have questions, please do not hesitate to call me. I look forward to following Ms. [address] along with you. Sincerely, Jyoti Osorio MD

## 2020-09-09 NOTE — PROGRESS NOTES
Chief Complaint   Patient presents with    Neurologic Problem         HISTORY OF PRESENT ILLNESS  Kendra Mccarty is a 62 y.o. female who came in for neurological consultation requested by Tarik Krause NP. She has been quite sometime and she has some localized arthritis. She has been working with orthopedics and recently had knee arthrogram as she is very claustrophobic. She is not a candidate for knee surgery at this time. She has also been experiencing pain in the lower back, pain in her left thigh and there is a concern that there could be a radicular component to her symptoms. She had a lumbar spine open MRI which showed the disc desiccation with posterior bulging at L2-L3, L3-L4. At L3-L4 she has mild narrowing of the thecal sac due to the disc bulge. There is no gross root compression seen. She states that in the evening she has had difficulty lifting her left leg and at times it becomes difficult for her to stand up from a sitting position. She also experiences pain in her left foot off and on. Past Medical History:   Diagnosis Date    Diabetes ([facility]     pre-diabetic    GERD (gastroesophageal reflux disease)     Hypertension     Other ill-defined conditions(799.89)     environmental allergies - oak, pollen, dust, mold    Other ill-defined conditions(799.89)     clotting disorder - makes too much factor 8    Thromboembolus ([facility] [address]     PE x 3 - all at once    Thrombophilia (HCC)     Thyroid disease      Current Outpatient Medications   Medication Sig    docosahexanoic acid/epa (FISH OIL PO) Take  by mouth.  warfarin (COUMADIN) 5 mg tablet Take 5 mg by mouth daily. Take as directed     SYNTHROID 88 mcg tablet Take  by mouth daily.  CLIMARA 0.0375 mg/24 hr every seven (7) days.  lisinopril (PRINIVIL, ZESTRIL) 5 mg tablet Take  by mouth daily.  cetirizine (ZYRTEC) 10 mg tablet Take 10 mg by mouth daily.       fluticasone (FLONASE) 50 mcg/Actuation nasal spray 1 Spray by Both Nostrils route daily.  omeprazole (PRILOSEC) 20 mg capsule 40 mg.    LACTOBACILLUS RHAMNOSUS GG (PROBIOTIC PO) Take  by mouth. Takes one po daily.  albuterol (PROVENTIL HFA) 90 mcg/Actuation inhaler Take 1 Puff by inhalation. No current facility-administered medications for this visit. Allergies   Allergen Reactions    Biaxin [Clarithromycin] Other (comments)     \"makes my INR high\"    Cephalexin Other (comments)     itching    Ciprofloxacin Other (comments)     itching    Levaquin [Levofloxacin] Other (comments)     \"tingling\" around mouth - Pt states she is not allergic to this. Has had one yr ago.  Naprosyn [Naproxen] Hives    Pcn [Penicillins] Hives    Tequin [Gatifloxacin] Other (comments)     Stinging around mouth.      Family History   Problem Relation Age of Onset    Kidney Disease Mother         failure    Diabetes Mother      Social History     Tobacco Use    Smoking status: Never Smoker    Smokeless tobacco: Never Used   Substance Use Topics    Alcohol use: No    Drug use: Never     Past Surgical History:   Procedure Laterality Date    HX APPENDECTOMY  1993    HX GYN      lysis of adhesions 3-4 x for endometriosis    HX HYSTERECTOMY  1994    with oophorectomy    HX OTHER SURGICAL  2010    EGD - h.pylori/colon - diverticulosis    HX OTHER SURGICAL      surgery at 6 wks of age for ?plyoric stenosis         REVIEW OF SYSTEMS  Review of Systems - History obtained from the patient  Psychological ROS: negative  ENT ROS: negative  Hematological and Lymphatic ROS: negative  Endocrine ROS: negative  Respiratory ROS: no cough, shortness of breath, or wheezing  Cardiovascular ROS: no chest pain or dyspnea on exertion  Gastrointestinal ROS: no abdominal pain, change in bowel habits, or black or bloody stools  Genito-Urinary ROS: no dysuria, trouble voiding, or hematuria  Musculoskeletal ROS: negative  positive for - joint pain, L knee  Dermatological ROS: negative      PHYSICAL EXAMINATION:    Visit Vitals  /84   Pulse 68   Resp 16   Ht 5' 4\" (1.626 m)   Wt 120.7 kg (266 lb)   SpO2 92%   BMI 45.66 kg/m²     General:  Well nourished and groomed individual in no acute distress. Neck: Supple, nontender, no bruits, no pain with resistance to active range of motion. Heart: Regular rate and rhythm. Normal S1S2. Lungs:  Equal chest expansion, no cough, no wheeze  Musculoskeletal:  Extremities revealed no edema and had full range of motion of joints. Psych:  Good mood and bright affect    NEUROLOGICAL EXAMINATION:     Mental Status:   Alert and oriented to person, place, and time with recent and remote memory intact. Attention span and concentration are normal. Speech is fluent. Cranial Nerves:    II, III, IV, VI:  Visual acuity grossly intact. Visual fields are normal.    Pupils are equal, round, and reactive to light and accommodation. Extra-ocular movements are full and fluid. Fundoscopic exam was benign, no ptosis or nystagmus. V-XII: Hearing is grossly intact. Facial features are symmetric, with normal sensation and strength. The palate rises symmetrically and the tongue protrudes midline. Sternocleidomastoids 5/5. Motor Examination: Normal tone, bulk, and strength. 5/5 muscle strength throughout. Trace weakness of the knee extension on the left when compared to the right. No cogwheeling, rigidity or clonus present. Sensory exam:  Normal throughout to pinprick, temperature, and vibration sense. Normal proprioception. Coordination:  Finger to nose and rapid arm movement testing was normal.   No resting or intention tremor    Gait and Station:  Steady. Normal arm swing. No Rhomberg or pronator drift. No muscle wasting or fasiculations noted. Reflexes:  DTRs 2+ in the upper extremities. Knee jerks 1/2. Ankle jerks difficult to elicit. Toes downgoing.         LABS / IMAGING  MRI of the lumbar spine findings as discussed above    ASSESSMENT    ICD-10-CM ICD-9-CM    1. Lumbar and sacral spondylarthritis  M47.817 721.3 EMG NCV MOTOR WO F/WAVE PER NERVE   2. Pain of left lower extremity  M79.605 729.5 EMG NCV MOTOR WO F/WAVE PER NERVE       DISCUSSION  Ms. Andrea Clark has been experiencing localized knee pain on the left side with intermittent pain in her lower back, left thigh and left foot. I agree that that there may be a component of radicular pain at L4 level and the pain around the knee region could be a combination of localized arthritis and radiculopathy. Her MRI of the lumbar spine does not show any significant degenerative change that would require surgical intervention. I have recommended EMG/NCV for further diagnostic clarification and to assess for radiculopathy/plexopathy  She should benefit from a physical therapy for the lower back and the importance of follow-up routine exercise program, weight loss was discussed    Thank you for allowing me to participate in the care of Ms. Castro Aripeka Bhavya. Please feel free to contact me if you have any questions. I will be happy to follow to follow her along with you. Alyce Mcardle, MD  Diplomate, American Board of Psychiatry & Neurology (Neurology)  [employer] Board of Psychiatry & Neurology (Clinical Neurophysiology)  Diplomate, American Board of Electrodiagnostic Medicine  This note will not be viewable in [address]

## 2020-09-10 ENCOUNTER — TELEPHONE (OUTPATIENT)
Dept: NEUROLOGY | Facility: CLINIC | Age: 60
End: 2020-09-10

## 2020-09-11 ENCOUNTER — TELEPHONE (OUTPATIENT)
Dept: NEUROLOGY | Facility: CLINIC | Age: 60
End: 2020-09-11

## 2020-09-14 ENCOUNTER — TELEPHONE (OUTPATIENT)
Dept: NEUROLOGY | Facility: CLINIC | Age: 60
End: 2020-09-14

## 2020-09-28 ENCOUNTER — DOCUMENTATION ONLY (OUTPATIENT)
Dept: NEUROLOGY | Facility: CLINIC | Age: 60
End: 2020-09-28

## 2020-09-28 NOTE — PROGRESS NOTES
Patient had EMG done by Dr. Frankie Spence. ~Low motor amplitudes of peroneal response of unclear significance. No abnormalities noted on her EMG to support a diagnosis of L5 radiculopathy. No conduction block seen in the peroneal nerve.

## 2021-04-07 ENCOUNTER — APPOINTMENT (OUTPATIENT)
Dept: CT IMAGING | Age: 61
End: 2021-04-07
Attending: EMERGENCY MEDICINE
Payer: COMMERCIAL

## 2021-04-07 ENCOUNTER — HOSPITAL ENCOUNTER (EMERGENCY)
Age: 61
Discharge: HOME | End: 2021-04-07
Attending: EMERGENCY MEDICINE
Payer: COMMERCIAL

## 2021-04-07 VITALS
RESPIRATION RATE: 16 BRPM | HEIGHT: 64 IN | HEART RATE: 94 BPM | DIASTOLIC BLOOD PRESSURE: 80 MMHG | SYSTOLIC BLOOD PRESSURE: 145 MMHG | OXYGEN SATURATION: 97 % | WEIGHT: 274.91 LBS | BODY MASS INDEX: 46.93 KG/M2 | TEMPERATURE: 98.2 F

## 2021-04-07 DIAGNOSIS — B02.9 HERPES ZOSTER WITHOUT COMPLICATION: Primary | ICD-10-CM

## 2021-04-07 DIAGNOSIS — R06.02 SOB (SHORTNESS OF BREATH): ICD-10-CM

## 2021-04-07 LAB
ERYTHROCYTE [DISTWIDTH] IN BLOOD BY AUTOMATED COUNT: 16.9 % (ref 11.5–14.5)
HCT VFR BLD AUTO: 41.7 % (ref 35–47)
HGB BLD-MCNC: 12.9 G/DL (ref 11.5–16)
MCH RBC QN AUTO: 28 PG (ref 26–34)
MCHC RBC AUTO-ENTMCNC: 30.9 G/DL (ref 30–36.5)
MCV RBC AUTO: 90.7 FL (ref 80–99)
NRBC # BLD: 0.02 K/UL (ref 0–0.01)
NRBC BLD-RTO: 0.2 PER 100 WBC
PLATELET # BLD AUTO: 392 K/UL (ref 150–400)
PMV BLD AUTO: 10.9 FL (ref 8.9–12.9)
RBC # BLD AUTO: 4.6 M/UL (ref 3.8–5.2)
WBC # BLD AUTO: 11.9 K/UL (ref 3.6–11)

## 2021-04-07 PROCEDURE — 71275 CT ANGIOGRAPHY CHEST: CPT

## 2021-04-07 PROCEDURE — 85027 COMPLETE CBC AUTOMATED: CPT

## 2021-04-07 PROCEDURE — 74011000636 HC RX REV CODE- 636: Performed by: EMERGENCY MEDICINE

## 2021-04-07 PROCEDURE — 36415 COLL VENOUS BLD VENIPUNCTURE: CPT

## 2021-04-07 PROCEDURE — 99284 EMERGENCY DEPT VISIT MOD MDM: CPT

## 2021-04-07 PROCEDURE — 80047 BASIC METABLC PNL IONIZED CA: CPT

## 2021-04-07 RX ADMIN — IOPAMIDOL 80 ML: 755 INJECTION, SOLUTION INTRAVENOUS at 21:29

## 2021-04-07 NOTE — ED TRIAGE NOTES
Pt states that she has shingles on her back that is almost gone. Pt states that she cant get a deep breath but she isnt sure if it is the pain from the shingles. Pt states that she is on Xarelto 20mg because she has a hx of PEs, [facility] and it was normal, so they sent her to the ER to rule out a blood clot.

## 2021-04-08 LAB
CA-I BLD-MCNC: 1.27 MMOL/L (ref 1.12–1.32)
CREAT BLD-MCNC: 0.6 MG/DL (ref 0.6–1.3)
GLUCOSE BLD-MCNC: 183 MG/DL (ref 65–100)
HCT VFR BLD CALC: 42 % (ref 35–47)
POTASSIUM BLD-SCNC: 4.5 MMOL/L (ref 3.5–5.1)
SODIUM BLD-SCNC: 137 MMOL/L (ref 136–145)

## 2021-04-08 NOTE — ED PROVIDER NOTES
64-year-old female presents from home with a complaint of shortness of breath. She has been recovering from shingles infection across the right side of her back with improved rash and pain. Starting yesterday she noticed that she felt she couldn't take a full deep breath. She denies any real chest pain. No cough or fevers. Patient is currently on Xarelto which started a month ago after having been on warfarin for 15 years due to multiple PEs in the past.  [facility] x-ray done that was reportedly unremarkable but then sent to the emergent department for further evaluation of PE.            Past Medical History:   Diagnosis Date    Diabetes ([address]     pre-diabetic    GERD (gastroesophageal reflux disease)     Hypertension     Other ill-defined conditions(799.89)     environmental allergies - oak, pollen, dust, mold    Other ill-defined conditions(799.89)     clotting disorder - makes too much factor 8    Thromboembolus ([address]     PE x 3 - all at once    Thrombophilia ([facility] [address]     Thyroid disease        Past Surgical History:   Procedure Laterality Date    HX APPENDECTOMY  1993    HX GYN      lysis of adhesions 3-4 x for endometriosis    HX HYSTERECTOMY  1994    with oophorectomy    HX OTHER SURGICAL  2010    EGD - h.pylori/colon - diverticulosis    HX OTHER SURGICAL      surgery at 6 wks of age for ?plyoric stenosis         Family History:   Problem Relation Age of Onset    Kidney Disease Mother         failure    Diabetes Mother        Social History     Socioeconomic History    Marital status:      Spouse name: Not on file    Number of children: Not on file    Years of education: Not on file    Highest education level: Not on file   Occupational History    Not on file   Social Needs    Financial resource strain: Not on file    Food insecurity     Worry: Not on file     Inability: Not on file    Transportation needs     Medical: Not on file Non-medical: Not on file   Tobacco Use    Smoking status: Never Smoker    Smokeless tobacco: Never Used   Substance and Sexual Activity    Alcohol use: No    Drug use: Never    Sexual activity: Not on file   Lifestyle    Physical activity     Days per week: Not on file     Minutes per session: Not on file    Stress: Not on file   Relationships    Social connections     Talks on phone: Not on file     Gets together: Not on file     Attends Anabaptism service: Not on file     Active member of club or organization: Not on file     Attends meetings of clubs or organizations: Not on file     Relationship status: Not on file    Intimate partner violence     Fear of current or ex partner: Not on file     Emotionally abused: Not on file     Physically abused: Not on file     Forced sexual activity: Not on file   Other Topics Concern    Not on file   Social History Narrative    Not on file         ALLERGIES: Biaxin [clarithromycin], Cephalexin, Ciprofloxacin, Levaquin [levofloxacin], Naprosyn [naproxen], Pcn [penicillins], and Tequin [gatifloxacin]    Review of Systems   Constitutional: Negative for fever. HENT: Negative for facial swelling. Eyes: Negative for visual disturbance. Respiratory: Negative for chest tightness. Cardiovascular: Negative for chest pain. Gastrointestinal: Negative for abdominal pain. Genitourinary: Negative for difficulty urinating and dysuria. Musculoskeletal: Negative for arthralgias. Skin: Negative for rash. Neurological: Negative for headaches. Hematological: Negative for adenopathy. Psychiatric/Behavioral: Negative for suicidal ideas. Vitals:    04/07/21 1922 04/07/21 1945   BP: (!) 159/86 (!) 145/80   Pulse: 94    Resp: 16    Temp: 98.2 °F (36.8 °C)    SpO2: 97% 99%   Weight: 124.7 kg (274 lb 14.6 oz)    Height: 5' 4\" (1.626 m)             Physical Exam  Vitals signs and nursing note reviewed.    Constitutional:       General: She is not in acute distress. Appearance: She is well-developed. HENT:      Head: Normocephalic and atraumatic. Eyes:      General: No scleral icterus. Conjunctiva/sclera: Conjunctivae normal.      Pupils: Pupils are equal, round, and reactive to light. Neck:      Musculoskeletal: Normal range of motion and neck supple. Cardiovascular:      Rate and Rhythm: Normal rate. Heart sounds: No murmur. Pulmonary:      Effort: Pulmonary effort is normal. No respiratory distress. Abdominal:      General: There is no distension. Musculoskeletal: Normal range of motion. Skin:     General: Skin is warm and dry. Findings: No rash. Neurological:      Mental Status: She is alert and oriented to person, place, and time. MDM  Number of Diagnoses or Management Options  Herpes zoster without complication  SOB (shortness of breath)  Diagnosis management comments: Assessment: Patient's vital signs are unremarkable. O2 sats were reassuring. CT of her chest was unremarkable. I believe her symptoms are most likely related to the resolving shingles infection. I think she stable for discharge home at this point to continue her Xarelto treatment. She can return to the ED if she has any new or worsening symptoms.        Amount and/or Complexity of Data Reviewed  Clinical lab tests: reviewed  Tests in the radiology section of CPT®: reviewed           Procedures

## 2022-01-05 ENCOUNTER — TRANSCRIBE ORDER (OUTPATIENT)
Dept: SCHEDULING | Age: 62
End: 2022-01-05

## 2022-01-05 DIAGNOSIS — K43.9 HERNIA, VENTRAL: Primary | ICD-10-CM

## 2022-01-05 DIAGNOSIS — Z12.11 ENCOUNTER FOR SCREENING COLONOSCOPY: ICD-10-CM

## 2022-01-05 DIAGNOSIS — Z79.01 LONG TERM (CURRENT) USE OF ANTICOAGULANTS: ICD-10-CM

## 2023-03-23 ENCOUNTER — OFFICE VISIT (OUTPATIENT)
Dept: ORTHOPEDIC SURGERY | Age: 63
End: 2023-03-23

## 2023-03-23 VITALS — BODY MASS INDEX: 46.78 KG/M2 | WEIGHT: 274 LBS | HEIGHT: 64 IN

## 2023-03-23 DIAGNOSIS — M79.672 LEFT FOOT PAIN: ICD-10-CM

## 2023-03-23 DIAGNOSIS — M89.8X9 BONE MASS: Primary | ICD-10-CM

## 2023-03-23 NOTE — PROGRESS NOTES
Crystal Taylor (: 1960) is a 65 y.o. female, patient,here for evaluation of the following   Chief Complaint   Patient presents with    Foot Pain     left        ASSESSMENT/PLAN:  Below is the assessment and plan developed based on review of pertinent history, physical exam, labs, studies, and medications. 1. Bone mass  2. Left foot pain  -     XR STANDING FOOT LT MIN 3 V; Future  -     CT LOW EXT LT WO CONT; Future    Patient verbalized understanding of exam findings and treatment plan. We engaged in the shared decision-making process and treatment options were discussed at length with the patient. Surgical and conservative management discussed today along with risk and benefits. The patient is informed of findings on exam and x-rays reviewed. This does not appear to be ganglion cyst as there is also correlation of a bone mass specifically at the site with the prominence is notable on the AP x-ray of the foot. This appears to be a well circumscribed circular mass off the lateral wall of the calcaneus. She has no other masses seen on exam exam or x-rays. For further review of this, we can order a CT scan to localize this lesion and determine any abnormalities. It could be just an exostosis that is benign. In the meantime, appropriate shoe wear taking the pressure off the side of the heel would be helpful to avoid pain. I discussed management options with the patient. All questions were answered to the best of my ability and to the patient's satisfaction. I discussed their history, symptoms, physical exam findings, diagnostic testing results, diagnosis and treatment options. The patient verbalized understanding and elected to proceed with conservative treatment as above pending CT scan results. Return for Review CT scan when completed, treatment as indicated.       Allergies   Allergen Reactions    Biaxin [Clarithromycin] Other (comments)     \"makes my INR high\"    Cephalexin Other (comments)     itching    Ciprofloxacin Other (comments)     itching    Levaquin [Levofloxacin] Other (comments)     \"tingling\" around mouth - Pt states she is not allergic to this. Has had one yr ago. Naprosyn [Naproxen] Hives    Pcn [Penicillins] Hives    Tequin [Gatifloxacin] Other (comments)     Stinging around mouth. Current Outpatient Medications   Medication Sig    docosahexanoic acid/epa (FISH OIL PO) Take  by mouth.    warfarin (COUMADIN) 5 mg tablet Take 5 mg by mouth daily. Take as directed     SYNTHROID 88 mcg tablet Take  by mouth daily. omeprazole (PRILOSEC) 20 mg capsule 40 mg. CLIMARA 0.0375 mg/24 hr every seven (7) days. lisinopril (PRINIVIL, ZESTRIL) 5 mg tablet Take  by mouth daily. LACTOBACILLUS RHAMNOSUS GG (PROBIOTIC PO) Take  by mouth. Takes one po daily. cetirizine (ZYRTEC) 10 mg tablet Take 10 mg by mouth daily. fluticasone (FLONASE) 50 mcg/Actuation nasal spray 1 [place] by Both Nostrils route daily. albuterol (PROVENTIL HFA) 90 mcg/Actuation inhaler Take 1 Puff by inhalation. No current facility-administered medications for this visit.        Past Medical History:   Diagnosis Date    Diabetes ([address]     pre-diabetic    GERD (gastroesophageal reflux disease)     Hypertension     Other ill-defined conditions(799.89)     environmental allergies - oak, pollen, dust, mold    Other ill-defined conditions(799.89)     clotting disorder - makes too much factor 8    Thromboembolus ([address]     PE x 3 - all at once    Thrombophilia ([address]     Thyroid disease        Past Surgical History:   Procedure Laterality Date    HX APPENDECTOMY  1993    HX GYN      lysis of adhesions 3-4 x for endometriosis    HX HYSTERECTOMY  1994    with oophorectomy    HX OTHER SURGICAL  2010    EGD - h.pylori/colon - diverticulosis    HX OTHER SURGICAL      surgery at 6 wks of age for ?plyoric stenosis       Family History   Problem Relation Age of Onset    Kidney Disease Mother failure    Diabetes Mother        Social History     Socioeconomic History    Marital status:      Spouse name: Not on file    Number of children: Not on file    Years of education: Not on file    Highest education level: Not on file   Occupational History    Not on file   Tobacco Use    Smoking status: Never    Smokeless tobacco: Never   Substance and Sexual Activity    Alcohol use: No    Drug use: Never    Sexual activity: Not on file   Other Topics Concern    Not on file   Social History Narrative    Not on file     Social Determinants of Health     Financial Resource Strain: Not on file   Food Insecurity: Not on file   Transportation Needs: Not on file   Physical Activity: Not on file   Stress: Not on file   Social Connections: Not on file   Intimate Partner Violence: Not on file   Housing Stability: Not on file           Vitals:  Ht 5' 4\" (1.626 m)   Wt 274 lb (124.3 kg)   BMI 47.03 kg/m²    Body mass index is 47.03 kg/m². SUBJECTIVE:  Lashandajuve Marcusyue (: 1960)   New patient presents today with complaint of left foot pain and notable bone prominence noted 3 weeks ago as she was experiencing some pain there and started to push on it and noticed she had this bump. She has not noticed it getting larger but was concerned about it so she presents today with complaint of mild pain that is dull and comes and goes and there is sometimes swelling in the area just behind the bone prominence spot. Extended periods of walking or climbing stairs causes slight edema to the area. She has not tried any treatments but is taking Tylenol on occasion. She was seen by a PA at [facility] recently and x-rays were obtained. She is not a smoker of cigarettes, she is diabetic with an A1c of 6.2. She has hypertension, she has a blood clotting disorder so on anticoagulant Eliquis. He has not had any injuries.         OBJECTIVE EXAM:     Visit Vitals  Ht 5' 4\" (1.626 m)   Wt 274 lb (124.3 kg)   BMI 47.03 kg/m² Appearance: Alert, well appearing and pleasant patient who is in no distress, oriented to person, place/time, and who follows commands. This patient is accompanied in the       office by her  self. Psychiatric: Affect and mood are appropriate. No dementia noted on examination  Musculoskeletal:  LOCATION: lateral calcaneus solid mass foot - left      Integumentary: No rashes, skin patches, wounds, or abrasions to the right or left legs       Warm and normal color. No regions of expressible drainage. Gait: Normal      Tenderness: No tenderness        Motor/Strength/Tone Exam: Normal       Sensory Exam:   Intact Normal Sensation to ankle/foot      Stability Testing: No anterolateral or varus instability of the Ankle or Subtalar Joints               No peroneal tendon instability noted      ROM: Normal ROM noted to ankle/foot      Contractures: No Achilles or Gastrocnemius Contractures      Calf tenderness: Absent for calf or gastrocnemius muscle regions       Soft, supple, non tender, non taut lower extremity compartment  Alignment:      NEUTRAL Hindfoot,         none Metatarsus Adductus Metatarsus   Wounds/Abrasions:    None present  Extremities:   No embolic phenomena to the toes          No significant edema to the foot and or toes. Lower extremities are warm and appear well perfused    DVT: No evidence of DVT seen on examination at this time     No calf swelling, no tenderness to calf muscles  Lymphatic:  No Evidence of Lymphedema  Vascular: Medial Border of Tibia Region: Edema is not present         Pulses: Dorsalis Pedis &  Posterior Tibial Pulses : Palpable yes        Varicosities Lower Limbs :  None  noted  Neuro: Negative bilateral Straight leg raise (seated position)    See Musculoskeletal section for pertinent individual extremity examination    No abnormal hand/wrist, foot/ankle, or facial/neck tremors.     Lower Extremity/Ankle/Foot:  Normal gait, satisfactory weightbearing stance. Left lower extremity/ankle: There is full active and passive range of motion intact similar to contra lower extremity, strength is 5/5 in all direction of motion, there is no joint effusions, calves are soft nontender, tibia and fibula nontender, ligaments are grossly stable. Negative Ocampo test, negative ankle squeeze test.    Left foot: There is no severe malalignment or deformity, palpation of the lateral border of foot shows a solid rigid lesion on palpation is mildly tender there is slight swelling behind it, the lesion does not transilluminate with light on exam.  Rest of foot is nontender. Able to flex and extend all toes good range of motion and strength. Contralateral lower extremity: Skin intact without erythema or wounds. 2+ dorsalis pedis pulse. Toes are warm, and well-perfused. Sensation is intact to light touch in the DP, SP, sural, saphenous, and tibial nerve distributions. 5/5 strength in ankle dorsiflexion, plantarflexion, inversion, and eversion. Ankle range of motion is 10 degrees of dorsiflexion to 40 degrees of plantarflexion. Smooth and painless hindfoot and midfoot range of motion. No severe hallux, lesser toe malalignment or deformities, no pain with passive motion of lesser MTP joints, hallux MTP joint, no first TMT instability. Neurovascular exam is intact for light touch sensation all nerve distributions similar to contralateral, 5/5 strength EHL/FHL, 2+ dorsalis pedis pulse. Imaging:    XR Results (most recent):  Results from Appointment encounter on 03/23/23    XR STANDING FOOT LT MIN 3 V    Narrative  Left foot standing AP, lateral and oblique x-rays show no acute fractures or dislocations, no acute abnormalities, on the AP view the lateral border of the calcaneus there is a pedunculated appearing osseous mass, no other lesions are seen other than this 1 lesion, otherwise satisfactory bone density.           An electronic signature was used to authenticate this note.   -- Kelly Recio MD

## 2023-03-23 NOTE — LETTER
3/28/2023    Patient: Kathie Fry   YOB: 1960   Date of Visit: 3/23/2023     [place] [id number]  Via Fax: [phone]    Dear Toni Higuera DO,      Thank you for referring Ms. Kathryn Hatfield to [facility] for evaluation. My notes for this consultation are attached. If you have questions, please do not hesitate to call me. I look forward to following your patient along with you.       Sincerely,    Tanner Peterson MD

## 2023-03-24 ENCOUNTER — HOSPITAL ENCOUNTER (OUTPATIENT)
Dept: CT IMAGING | Age: 63
Discharge: HOME | End: 2023-03-24
Attending: ORTHOPAEDIC SURGERY
Payer: COMMERCIAL

## 2023-03-24 DIAGNOSIS — M79.672 LEFT FOOT PAIN: ICD-10-CM

## 2023-03-24 PROCEDURE — 73700 CT LOWER EXTREMITY W/O DYE: CPT

## 2023-03-29 ENCOUNTER — OFFICE VISIT (OUTPATIENT)
Dept: ORTHOPEDIC SURGERY | Age: 63
End: 2023-03-29
Payer: COMMERCIAL

## 2023-03-29 VITALS — BODY MASS INDEX: 46.78 KG/M2 | HEIGHT: 64 IN | WEIGHT: 274 LBS

## 2023-03-29 DIAGNOSIS — M77.52 BONE SPUR OF LEFT FOOT: ICD-10-CM

## 2023-03-29 DIAGNOSIS — M67.88 PERONEAL TENDINOSIS, LEFT: Primary | ICD-10-CM

## 2023-03-29 PROCEDURE — 99212 OFFICE O/P EST SF 10 MIN: CPT | Performed by: ORTHOPAEDIC SURGERY

## 2023-03-29 NOTE — PROGRESS NOTES
Gio Lopez (: 1960) is a 65 y.o. female, patient,here for evaluation of the following   Chief Complaint   Patient presents with    Foot Pain     left        ASSESSMENT/PLAN:  Below is the assessment and plan developed based on review of pertinent history, physical exam, labs, studies, and medications. Peroneal tendinosis, left  Bone spur of left foot (lateral calcaneus prominent peroneal tubercle)    Patient is informed of findings on the examination and also of the CT scan reviewed, reassured that this is nothing concerning, there is generally a bone ridge between the 2 tendons also known as peroneal tubercle, there could be some irritation to the area over time resulted in a more prominent peroneal tubercle. We generally do not recommend removing this as it does provide support to the tendons however if vertigo becomes larger and more persistent, an MRI might be useful to evaluate also the tendons above and below the tubercle and whether they are affecting this prominent enthesophyte. In the meantime, she is not symptomatic enough to consider surgical treatment. Recommend appropriate shoes that provide good support and not too much pressure to the area. She agrees with this plan. Patient verbalized understanding of exam findings and treatment plan. We engaged in the shared decision-making process and treatment options were discussed at length with the patient. Surgical and conservative management discussed today along with risk and benefits. Return if symptoms worsen or fail to improve. Allergies   Allergen Reactions    Biaxin [Clarithromycin] Other (comments)     \"makes my INR high\"    Cephalexin Other (comments)     itching    Ciprofloxacin Other (comments)     itching    Levaquin [Levofloxacin] Other (comments)     \"tingling\" around mouth - Pt states she is not allergic to this. Has had one yr ago.     Naprosyn [Naproxen] Hives    Pcn [Penicillins] Hives    Tequin [Gatifloxacin] Other (comments)     Stinging around mouth. Current Outpatient Medications   Medication Sig    docosahexanoic acid/epa (FISH OIL PO) Take  by mouth.    warfarin (COUMADIN) 5 mg tablet Take 5 mg by mouth daily. Take as directed     SYNTHROID 88 mcg tablet Take  by mouth daily. omeprazole (PRILOSEC) 20 mg capsule 40 mg. CLIMARA 0.0375 mg/24 hr every seven (7) days. lisinopril (PRINIVIL, ZESTRIL) 5 mg tablet Take  by mouth daily. LACTOBACILLUS RHAMNOSUS GG (PROBIOTIC PO) Take  by mouth. Takes one po daily. cetirizine (ZYRTEC) 10 mg tablet Take 10 mg by mouth daily. fluticasone (FLONASE) 50 mcg/Actuation nasal spray 1 [place] by Both Nostrils route daily. albuterol (PROVENTIL HFA) 90 mcg/Actuation inhaler Take 1 Puff by inhalation. No current facility-administered medications for this visit.        Past Medical History:   Diagnosis Date    Diabetes ([address]     pre-diabetic    GERD (gastroesophageal reflux disease)     Hypertension     Other ill-defined conditions(799.89)     environmental allergies - oak, pollen, dust, mold    Other ill-defined conditions(799.89)     clotting disorder - makes too much factor 8    Thromboembolus ([address]     PE x 3 - all at once    Thromb[facility] [address]     Thyroid disease        Past Surgical History:   Procedure Laterality Date    HX APPENDECTOMY  1993    HX GYN      lysis of adhesions 3-4 x for endometriosis    HX HYSTERECTOMY  1994    with oophorectomy    HX OTHER SURGICAL  2010    EGD - h.pylori/colon - diverticulosis    HX OTHER SURGICAL      surgery at 6 wks of age for ?plyoric stenosis       Family History   Problem Relation Age of Onset    Kidney Disease Mother         failure    Diabetes Mother        Social History     Socioeconomic History    Marital status:      Spouse name: Not on file    Number of children: Not on file    Years of education: Not on file    Highest education level: Not on file   Occupational History    Not on file   Tobacco Use    Smoking status: Never    Smokeless tobacco: Never   Substance and Sexual Activity    Alcohol use: No    Drug use: Never    Sexual activity: Not on file   Other Topics Concern    Not on file   Social History Narrative    Not on file     Social Determinants of Health     Financial Resource Strain: Not on file   Food Insecurity: Not on file   Transportation Needs: Not on file   Physical Activity: Not on file   Stress: Not on file   Social Connections: Not on file   Intimate Partner Violence: Not on file   Housing Stability: Not on file           Vitals:  Ht 5' 4\" (1.626 m)   Wt 274 lb (124.3 kg)   BMI 47.03 kg/m²    Body mass index is 47.03 kg/m². SUBJECTIVE:  Rick Mooney (: 1960)   Patient back today after CT scan completed, she had a larger lateral bone ridge and just to make sure there was nothing else going on with that, I recommended a CT scan to further evaluate as patient was concerned about it. It appeared to be a peroneal to tubercle ridge that had become more prominent. She states she had noticed this before and had noted it on recent evaluation of her foot because she was experiencing pain in the area. She states the pain is intermittent and not all the time and currently not having pain. OBJECTIVE EXAM:     Visit Vitals  Ht 5' 4\" (1.626 m)   Wt 274 lb (124.3 kg)   BMI 47.03 kg/m²       Appearance: Alert, well appearing and pleasant patient who is in no distress, oriented to person, place/time, and who follows commands. This patient is accompanied in the       office by her  self. Psychiatric: Affect and mood are appropriate. No dementia noted on examination  Musculoskeletal:  LOCATION: Minimal tenderness or swelling lateral hindfoot foot - left      Integumentary: No rashes, skin patches, wounds, or abrasions to the right or left legs       Warm and normal color. No regions of expressible drainage.       Gait: Normal      Tenderness: No tenderness        Motor/Strength/Tone Exam: Normal       Sensory Exam:   Intact Normal Sensation to ankle/foot      Stability Testing: No anterolateral or varus instability of the Ankle or Subtalar Joints               No peroneal tendon instability noted      ROM: Normal ROM noted to ankle/foot      Contractures: No Achilles or Gastrocnemius Contractures      Calf tenderness: Absent for calf or gastrocnemius muscle regions       Soft, supple, non tender, non taut lower extremity compartment  Alignment:      NEUTRAL Hindfoot,         none Metatarsus Adductus Metatarsus   Wounds/Abrasions:    None present  Extremities:   No embolic phenomena to the toes          No significant edema to the foot and or toes. Lower extremities are warm and appear well perfused    DVT: No evidence of DVT seen on examination at this time     No calf swelling, no tenderness to calf muscles  Lymphatic:  No Evidence of Lymphedema  Vascular: Medial Border of Tibia Region: Edema is not present         Pulses: Dorsalis Pedis &  Posterior Tibial Pulses : Palpable yes        Varicosities Lower Limbs :  None  noted  Neuro: Negative bilateral Straight leg raise (seated position)    See Musculoskeletal section for pertinent individual extremity examination    No abnormal hand/wrist, foot/ankle, or facial/neck tremors. Lower Extremity/Ankle/Foot:  Normal gait, normal weightbearing stance. Left lower extremity/ankle: Calf soft nontender, full range of motion at the knees and ankles, no tenderness around the tibia and fibula or ligaments or tendons of lower extremity. The area of peroneal tendons is minimally tender, mild along the lateral hindfoot. Left foot: Lateral calcaneus bone prominence around the peroneal tubercle ridge, slightly tender above the ridge. No significant swelling, the skin is intact without open wounds or lesion. Contralateral lower extremity: Skin intact without erythema or wounds. 2+ dorsalis pedis pulse. Toes are warm, and well-perfused. Sensation is intact to light touch in the DP, SP, sural, saphenous, and tibial nerve distributions. 5/5 strength in ankle dorsiflexion, plantarflexion, inversion, and eversion. Ankle range of motion is 10 degrees of dorsiflexion to 40 degrees of plantarflexion. Smooth and painless hindfoot and midfoot range of motion. No severe hallux, lesser toe malalignment or deformities, no pain with passive motion of lesser MTP joints, hallux MTP joint, no first TMT instability. Neurovascular exam is grossly intact. Imaging:    No x-rays obtained but reviewed the CT scan without contrast on PACS. The films obtained on March 24, 2023 were independently reviewed and it shows a peroneal tubercle ridge that is more prominent. There is slight irritation of the tendon above the ridge, otherwise no obvious tears seen. The radiology report also reviewed and detail information in chart. IMPRESSION  1. Prominent enthesophyte of the peroneal tubercle of the calcaneus. This may predispose to peroneal tendinopathy. 2.  Mild-to-moderate osteoarthritis, most pronounced along the midfoot and first ray. 3.  Posterior and plantar calcaneal enthesophytes. Plantar fascial thickening, correlate for plantar fasciitis. An electronic signature was used to authenticate this note.   -- Fabrice Teran MD

## 2023-03-29 NOTE — LETTER
3/31/2023    Patient: Deangelo Rosenthal   YOB: 1960   Date of Visit: 3/29/2023     Deepak Melendez, [address] [place] [address]  Via Fax: [phone]    Dear Deepak Melendez DO,      Thank you for referring Ms. Cindy Grayson to [facility] for evaluation. My notes for this consultation are attached. If you have questions, please do not hesitate to call me. I look forward to following your patient along with you.       Sincerely,    Theresa Flores MD

## 2023-05-10 RX ORDER — CETIRIZINE HYDROCHLORIDE 10 MG/1
10 TABLET ORAL DAILY
COMMUNITY

## 2023-05-10 RX ORDER — ALBUTEROL SULFATE 90 UG/1
1 AEROSOL, METERED RESPIRATORY (INHALATION)
COMMUNITY

## 2023-05-10 RX ORDER — FLUTICASONE PROPIONATE 50 MCG
1 SPRAY, SUSPENSION (ML) NASAL DAILY
COMMUNITY

## 2023-05-10 RX ORDER — ESTRADIOL 0.04 MG/D
PATCH TRANSDERMAL
COMMUNITY
Start: 2015-02-03

## 2023-05-10 RX ORDER — OMEPRAZOLE 20 MG/1
CAPSULE, DELAYED RELEASE ORAL
COMMUNITY
Start: 2015-01-23

## 2023-05-10 RX ORDER — LEVOTHYROXINE SODIUM 88 UG/1
TABLET ORAL DAILY
COMMUNITY
Start: 2015-01-26

## 2023-05-10 RX ORDER — LISINOPRIL 5 MG/1
TABLET ORAL DAILY
COMMUNITY

## 2023-05-10 RX ORDER — WARFARIN SODIUM 5 MG/1
TABLET ORAL DAILY
COMMUNITY

## 2025-05-14 ENCOUNTER — APPOINTMENT (OUTPATIENT)
Facility: HOSPITAL | Age: 65
End: 2025-05-14
Payer: MEDICARE

## 2025-05-14 ENCOUNTER — HOSPITAL ENCOUNTER (EMERGENCY)
Facility: HOSPITAL | Age: 65
Discharge: HOME | End: 2025-05-14
Attending: STUDENT IN AN ORGANIZED HEALTH CARE EDUCATION/TRAINING PROGRAM
Payer: MEDICARE

## 2025-05-14 VITALS
BODY MASS INDEX: 44.86 KG/M2 | HEART RATE: 81 BPM | TEMPERATURE: 98.7 F | SYSTOLIC BLOOD PRESSURE: 105 MMHG | WEIGHT: 262.79 LBS | RESPIRATION RATE: 18 BRPM | HEIGHT: 64 IN | DIASTOLIC BLOOD PRESSURE: 56 MMHG | OXYGEN SATURATION: 90 %

## 2025-05-14 DIAGNOSIS — R11.2 NAUSEA AND VOMITING, UNSPECIFIED VOMITING TYPE: Primary | ICD-10-CM

## 2025-05-14 DIAGNOSIS — R10.32 ABDOMINAL PAIN, LEFT LOWER QUADRANT: ICD-10-CM

## 2025-05-14 LAB
ALBUMIN SERPL-MCNC: 3.3 G/DL (ref 3.5–5)
ALBUMIN/GLOB SERPL: 0.7 (ref 1.1–2.2)
ALP SERPL-CCNC: 118 U/L (ref 45–117)
ALT SERPL-CCNC: 35 U/L (ref 12–78)
ANION GAP SERPL CALC-SCNC: 10 MMOL/L (ref 2–12)
AST SERPL-CCNC: 25 U/L (ref 15–37)
BASOPHILS # BLD: 0.07 K/UL (ref 0–0.1)
BASOPHILS NFR BLD: 0.7 % (ref 0–1)
BILIRUB SERPL-MCNC: 0.7 MG/DL (ref 0.2–1)
BUN SERPL-MCNC: 13 MG/DL (ref 6–20)
BUN/CREAT SERPL: 13 (ref 12–20)
CALCIUM SERPL-MCNC: 9.4 MG/DL (ref 8.5–10.1)
CHLORIDE SERPL-SCNC: 97 MMOL/L (ref 97–108)
CO2 SERPL-SCNC: 28 MMOL/L (ref 21–32)
CREAT SERPL-MCNC: 1 MG/DL (ref 0.55–1.02)
DIFFERENTIAL METHOD BLD: ABNORMAL
EOSINOPHIL # BLD: 0.06 K/UL (ref 0–0.4)
EOSINOPHIL NFR BLD: 0.6 % (ref 0–7)
ERYTHROCYTE [DISTWIDTH] IN BLOOD BY AUTOMATED COUNT: 15.8 % (ref 11.5–14.5)
GLOBULIN SER CALC-MCNC: 4.8 G/DL (ref 2–4)
GLUCOSE SERPL-MCNC: 136 MG/DL (ref 65–100)
HCT VFR BLD AUTO: 43.1 % (ref 35–47)
HGB BLD-MCNC: 14 G/DL (ref 11.5–16)
IMM GRANULOCYTES # BLD AUTO: 0.08 K/UL (ref 0–0.04)
IMM GRANULOCYTES NFR BLD AUTO: 0.8 % (ref 0–0.5)
LIPASE SERPL-CCNC: 26 U/L (ref 13–75)
LYMPHOCYTES # BLD: 1.3 K/UL (ref 0.8–3.5)
LYMPHOCYTES NFR BLD: 12.3 % (ref 12–49)
MAGNESIUM SERPL-MCNC: 2.1 MG/DL (ref 1.6–2.4)
MCH RBC QN AUTO: 29.5 PG (ref 26–34)
MCHC RBC AUTO-ENTMCNC: 32.5 G/DL (ref 30–36.5)
MCV RBC AUTO: 90.7 FL (ref 80–99)
MONOCYTES # BLD: 1.19 K/UL (ref 0–1)
MONOCYTES NFR BLD: 11.2 % (ref 5–13)
NEUTS SEG # BLD: 7.9 K/UL (ref 1.8–8)
NEUTS SEG NFR BLD: 74.4 % (ref 32–75)
NRBC # BLD: 0 K/UL (ref 0–0.01)
NRBC BLD-RTO: 0 PER 100 WBC
PLATELET # BLD AUTO: 318 K/UL (ref 150–400)
PLATELET COMMENT: ABNORMAL
PMV BLD AUTO: 10.2 FL (ref 8.9–12.9)
POTASSIUM SERPL-SCNC: 4 MMOL/L (ref 3.5–5.1)
PROT SERPL-MCNC: 8.1 G/DL (ref 6.4–8.2)
RBC # BLD AUTO: 4.75 M/UL (ref 3.8–5.2)
RBC MORPH BLD: ABNORMAL
SODIUM SERPL-SCNC: 135 MMOL/L (ref 136–145)
WBC # BLD AUTO: 10.6 K/UL (ref 3.6–11)

## 2025-05-14 PROCEDURE — 6360000004 HC RX CONTRAST MEDICATION: Performed by: STUDENT IN AN ORGANIZED HEALTH CARE EDUCATION/TRAINING PROGRAM

## 2025-05-14 PROCEDURE — 85025 COMPLETE CBC W/AUTO DIFF WBC: CPT

## 2025-05-14 PROCEDURE — 74177 CT ABD & PELVIS W/CONTRAST: CPT

## 2025-05-14 PROCEDURE — 83690 ASSAY OF LIPASE: CPT

## 2025-05-14 PROCEDURE — 96374 THER/PROPH/DIAG INJ IV PUSH: CPT

## 2025-05-14 PROCEDURE — 99285 EMERGENCY DEPT VISIT HI MDM: CPT

## 2025-05-14 PROCEDURE — 80053 COMPREHEN METABOLIC PANEL: CPT

## 2025-05-14 PROCEDURE — 83735 ASSAY OF MAGNESIUM: CPT

## 2025-05-14 PROCEDURE — 2580000003 HC RX 258: Performed by: STUDENT IN AN ORGANIZED HEALTH CARE EDUCATION/TRAINING PROGRAM

## 2025-05-14 PROCEDURE — 6360000002 HC RX W HCPCS: Performed by: STUDENT IN AN ORGANIZED HEALTH CARE EDUCATION/TRAINING PROGRAM

## 2025-05-14 RX ORDER — IOPAMIDOL 755 MG/ML
100 INJECTION, SOLUTION INTRAVASCULAR
Status: COMPLETED | OUTPATIENT
Start: 2025-05-14 | End: 2025-05-14

## 2025-05-14 RX ORDER — 0.9 % SODIUM CHLORIDE 0.9 %
1000 INTRAVENOUS SOLUTION INTRAVENOUS ONCE
Status: COMPLETED | OUTPATIENT
Start: 2025-05-14 | End: 2025-05-14

## 2025-05-14 RX ORDER — ONDANSETRON 2 MG/ML
4 INJECTION INTRAMUSCULAR; INTRAVENOUS ONCE
Status: COMPLETED | OUTPATIENT
Start: 2025-05-14 | End: 2025-05-14

## 2025-05-14 RX ADMIN — ONDANSETRON 4 MG: 2 INJECTION, SOLUTION INTRAMUSCULAR; INTRAVENOUS at 12:27

## 2025-05-14 RX ADMIN — SODIUM CHLORIDE 1000 ML: 0.9 INJECTION, SOLUTION INTRAVENOUS at 12:25

## 2025-05-14 RX ADMIN — IOPAMIDOL 100 ML: 755 INJECTION, SOLUTION INTRAVENOUS at 12:43

## 2025-05-14 ASSESSMENT — PAIN DESCRIPTION - ORIENTATION: ORIENTATION: LEFT;LOWER

## 2025-05-14 ASSESSMENT — PAIN DESCRIPTION - LOCATION: LOCATION: ABDOMEN

## 2025-05-14 ASSESSMENT — PAIN SCALES - GENERAL: PAINLEVEL_OUTOF10: 5

## 2025-05-14 NOTE — ED TRIAGE NOTES
Pt c/o taking senna for constipation this past week and now having LLQ abd pain, diarrhea, and n/v.

## 2025-05-14 NOTE — DISCHARGE INSTRUCTIONS
You presented to the ED with left lower quadrant pain and some constipation followed by loose stools.  Work appears reassuring.  Labs show no sign of dehydration or electrolyte abnormality.  CT imaging shows no acute process.  Moderate stool burden in the colon but no stool ball or severe constipation.  Recommend daily MiraLAX use until having normal soft daily stools.  Follow-up with your PCP

## 2025-05-16 NOTE — ED PROVIDER NOTES
EMERGENCY DEPARTMENT PHYSICIAN NOTE     Patient: Teresa Lancaster     Time of Service: 5/14/2025 11:14 AM     Chief complaint:   Chief Complaint   Patient presents with    Abdominal Pain    Vomiting        HISTORY:  Patient is a 65 y.o. female who presents to the emergency department with complaints of recent constipation with relief over the last few days but persistent abdominal pain.  Patient's abdominal pain was generalized and is now localized in the left lower quadrant.  Patient has a history of diverticulitis and is concerned she may be having recurrence of diverticulitis.  Patient took docusate senna and this helped with her constipation.  Her vital signs are stable and she is afebrile.  She is on Eliquis for clotting disorder.  Has a history of prediabetes, GERD.  History of thromboembolus likely secondary to her clotting disorder.  Patient has stable vital signs and is afebrile      Past Medical History:   Diagnosis Date    Diabetes (HCC)     pre-diabetic    GERD (gastroesophageal reflux disease)     Hypertension     Other ill-defined conditions(799.89)     clotting disorder - makes too much factor 8    Other ill-defined conditions(799.89)     environmental allergies - oak, pollen, dust, mold    Thromboembolus (HCC)     PE x 3 - all at once    Thrombophilia     Thyroid disease         Past Surgical History:   Procedure Laterality Date    APPENDECTOMY  1993    GYN      lysis of adhesions 3-4 x for endometriosis    HYSTERECTOMY (CERVIX STATUS UNKNOWN)  1994    with oophorectomy    OTHER SURGICAL HISTORY      surgery at 6 wks of age for ?plyoric stenosis    OTHER SURGICAL HISTORY  2010    EGD - h.pylori/colon - diverticulosis        Family History   Problem Relation Age of Onset    Diabetes Mother     Kidney Disease Mother         failure        Social History     Socioeconomic History    Marital status:      Spouse name: None    Number of children: None    Years of education: None    Highest

## 2025-08-21 ENCOUNTER — CLINICAL DOCUMENTATION (OUTPATIENT)
Age: 65
End: 2025-08-21